# Patient Record
Sex: FEMALE | Race: AMERICAN INDIAN OR ALASKA NATIVE
[De-identification: names, ages, dates, MRNs, and addresses within clinical notes are randomized per-mention and may not be internally consistent; named-entity substitution may affect disease eponyms.]

---

## 2019-05-30 ENCOUNTER — HOSPITAL ENCOUNTER (INPATIENT)
Dept: HOSPITAL 5 - ED | Age: 29
LOS: 3 days | Discharge: HOME | DRG: 871 | End: 2019-06-02
Attending: INTERNAL MEDICINE | Admitting: HOSPITALIST
Payer: SELF-PAY

## 2019-05-30 DIAGNOSIS — A41.9: Primary | ICD-10-CM

## 2019-05-30 DIAGNOSIS — Z79.4: ICD-10-CM

## 2019-05-30 DIAGNOSIS — R65.20: ICD-10-CM

## 2019-05-30 DIAGNOSIS — N30.00: ICD-10-CM

## 2019-05-30 DIAGNOSIS — Z93.1: ICD-10-CM

## 2019-05-30 DIAGNOSIS — Z79.899: ICD-10-CM

## 2019-05-30 DIAGNOSIS — Z91.040: ICD-10-CM

## 2019-05-30 DIAGNOSIS — Z88.5: ICD-10-CM

## 2019-05-30 DIAGNOSIS — G40.909: ICD-10-CM

## 2019-05-30 DIAGNOSIS — R13.10: ICD-10-CM

## 2019-05-30 DIAGNOSIS — E11.65: ICD-10-CM

## 2019-05-30 DIAGNOSIS — J18.9: ICD-10-CM

## 2019-05-30 DIAGNOSIS — G80.9: ICD-10-CM

## 2019-05-30 DIAGNOSIS — Z88.8: ICD-10-CM

## 2019-05-30 LAB
ALBUMIN SERPL-MCNC: 3.8 G/DL (ref 3.9–5)
ALT SERPL-CCNC: 21 UNITS/L (ref 7–56)
BACTERIA #/AREA URNS HPF: (no result) /HPF
BAND NEUTROPHILS # (MANUAL): 0 K/MM3
BILIRUB UR QL STRIP: (no result)
BLOOD UR QL VISUAL: (no result)
BUN SERPL-MCNC: 6 MG/DL (ref 7–17)
BUN/CREAT SERPL: 30 %
CALCIUM SERPL-MCNC: 9.8 MG/DL (ref 8.4–10.2)
HCT VFR BLD CALC: 42.6 % (ref 30.3–42.9)
HEMOLYSIS INDEX: 10
HGB BLD-MCNC: 14.5 GM/DL (ref 10.1–14.3)
INR PPP: 0.95 (ref 0.87–1.13)
MCHC RBC AUTO-ENTMCNC: 34 % (ref 30–34)
MCV RBC AUTO: 89 FL (ref 79–97)
MUCOUS THREADS #/AREA URNS HPF: (no result) /HPF
MYELOCYTES # (MANUAL): 0 K/MM3
PH UR STRIP: 7 [PH] (ref 5–7)
PLATELET # BLD: 220 K/MM3 (ref 140–440)
PROMYELOCYTES # (MANUAL): 0 K/MM3
PROT UR STRIP-MCNC: (no result) MG/DL
RBC # BLD AUTO: 4.81 M/MM3 (ref 3.65–5.03)
RBC #/AREA URNS HPF: 10 /HPF (ref 0–6)
TOTAL CELLS COUNTED BLD: 100
UROBILINOGEN UR-MCNC: 4 MG/DL (ref ?–2)
WBC #/AREA URNS HPF: 17 /HPF (ref 0–6)

## 2019-05-30 PROCEDURE — 84443 ASSAY THYROID STIM HORMONE: CPT

## 2019-05-30 PROCEDURE — 87205 SMEAR GRAM STAIN: CPT

## 2019-05-30 PROCEDURE — 87186 SC STD MICRODIL/AGAR DIL: CPT

## 2019-05-30 PROCEDURE — 81001 URINALYSIS AUTO W/SCOPE: CPT

## 2019-05-30 PROCEDURE — 85610 PROTHROMBIN TIME: CPT

## 2019-05-30 PROCEDURE — 87116 MYCOBACTERIA CULTURE: CPT

## 2019-05-30 PROCEDURE — 85025 COMPLETE CBC W/AUTO DIFF WBC: CPT

## 2019-05-30 PROCEDURE — 87040 BLOOD CULTURE FOR BACTERIA: CPT

## 2019-05-30 PROCEDURE — 99291 CRITICAL CARE FIRST HOUR: CPT

## 2019-05-30 PROCEDURE — 71275 CT ANGIOGRAPHY CHEST: CPT

## 2019-05-30 PROCEDURE — 71045 X-RAY EXAM CHEST 1 VIEW: CPT

## 2019-05-30 PROCEDURE — 85007 BL SMEAR W/DIFF WBC COUNT: CPT

## 2019-05-30 PROCEDURE — 82805 BLOOD GASES W/O2 SATURATION: CPT

## 2019-05-30 PROCEDURE — 96365 THER/PROPH/DIAG IV INF INIT: CPT

## 2019-05-30 PROCEDURE — 82962 GLUCOSE BLOOD TEST: CPT

## 2019-05-30 PROCEDURE — 36415 COLL VENOUS BLD VENIPUNCTURE: CPT

## 2019-05-30 PROCEDURE — 84439 ASSAY OF FREE THYROXINE: CPT

## 2019-05-30 PROCEDURE — 93005 ELECTROCARDIOGRAM TRACING: CPT

## 2019-05-30 PROCEDURE — 80053 COMPREHEN METABOLIC PANEL: CPT

## 2019-05-30 PROCEDURE — 93010 ELECTROCARDIOGRAM REPORT: CPT

## 2019-05-30 PROCEDURE — 87076 CULTURE ANAEROBE IDENT EACH: CPT

## 2019-05-30 PROCEDURE — 87400 INFLUENZA A/B EACH AG IA: CPT

## 2019-05-30 PROCEDURE — 82140 ASSAY OF AMMONIA: CPT

## 2019-05-30 PROCEDURE — 94640 AIRWAY INHALATION TREATMENT: CPT

## 2019-05-30 PROCEDURE — 80048 BASIC METABOLIC PNL TOTAL CA: CPT

## 2019-05-30 PROCEDURE — 87086 URINE CULTURE/COLONY COUNT: CPT

## 2019-05-30 PROCEDURE — 83735 ASSAY OF MAGNESIUM: CPT

## 2019-05-30 RX ADMIN — IPRATROPIUM BROMIDE SCH: 0.5 SOLUTION RESPIRATORY (INHALATION) at 13:14

## 2019-05-30 RX ADMIN — PIPERACILLIN AND TAZOBACTAM SCH MLS/HR: 4; .5 INJECTION, POWDER, FOR SOLUTION INTRAVENOUS at 10:00

## 2019-05-30 RX ADMIN — IPRATROPIUM BROMIDE SCH MG: 0.5 SOLUTION RESPIRATORY (INHALATION) at 19:28

## 2019-05-30 RX ADMIN — LEVALBUTEROL HYDROCHLORIDE SCH: 0.63 SOLUTION RESPIRATORY (INHALATION) at 13:14

## 2019-05-30 RX ADMIN — Medication SCH ML: at 23:03

## 2019-05-30 RX ADMIN — ENOXAPARIN SODIUM SCH MG: 100 INJECTION SUBCUTANEOUS at 09:17

## 2019-05-30 RX ADMIN — INSULIN GLARGINE SCH UNITS: 100 INJECTION, SOLUTION SUBCUTANEOUS at 22:41

## 2019-05-30 RX ADMIN — IPRATROPIUM BROMIDE SCH MG: 0.5 SOLUTION RESPIRATORY (INHALATION) at 08:14

## 2019-05-30 RX ADMIN — LEVALBUTEROL HYDROCHLORIDE SCH MG: 0.63 SOLUTION RESPIRATORY (INHALATION) at 19:28

## 2019-05-30 RX ADMIN — SODIUM CHLORIDE SCH MLS/HR: 0.9 INJECTION, SOLUTION INTRAVENOUS at 09:00

## 2019-05-30 RX ADMIN — ACETAMINOPHEN PRN MG: 325 TABLET ORAL at 08:54

## 2019-05-30 RX ADMIN — LEVALBUTEROL HYDROCHLORIDE SCH MG: 0.63 SOLUTION RESPIRATORY (INHALATION) at 08:13

## 2019-05-30 RX ADMIN — ACETAMINOPHEN PRN MG: 325 TABLET ORAL at 23:03

## 2019-05-30 RX ADMIN — SODIUM CHLORIDE SCH MLS/HR: 0.9 INJECTION, SOLUTION INTRAVENOUS at 22:43

## 2019-05-30 RX ADMIN — Medication SCH: at 15:16

## 2019-05-30 RX ADMIN — PIPERACILLIN AND TAZOBACTAM SCH MLS/HR: 4; .5 INJECTION, POWDER, FOR SOLUTION INTRAVENOUS at 17:42

## 2019-05-30 NOTE — EMERGENCY DEPARTMENT REPORT
ED Fever HPI





- General


Chief Complaint: Fever


Stated Complaint: POSSIBLE SEPSIS, CONGESTION


Time Seen by Provider: 05/30/19 02:38





- History of Present Illness


Initial Comments: 





28-year-old female with history of cerebral palsy, diabetes presents to ED with 

complaint of fever.  Patient is accompanied by her nurse, reports this patient 

had a temperature of 100.2 for which Tylenol was given, with associated cough 

and congestion that began on yesterday.  Other than the cough and fever, nurse 

reports patient is at her baseline.  Patient is tachycardic into the 150s.  

Denies vomiting or diarrhea.


Timing/Duration: yesterday


Fever Severity/Quality: low grade (100.2)


Fever Therapy PTA: Tylenol


Associated Symptoms: cough





ED Review of Systems


ROS: 


Stated complaint: POSSIBLE SEPSIS, CONGESTION


Other details as noted in HPI





Comment: Unobtainable due to pts medical conditions


Constitutional: fever


Respiratory: cough.  denies: shortness of breath





ED Past Medical Hx





- Past Medical History


Previous Medical History?: Yes


Hx Congestive Heart Failure: No


Hx Diabetes: No


Hx Seizures: Yes


Hx Asthma: Yes


Hx COPD: No


Hx HIV: No


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





- Surgical History


Past Surgical History?: Yes


Additional Surgical History: G. tube





- Social History


Smoking Status: Never Smoker


Substance Use Type: None





- Medications


Home Medications: 


                                Home Medications











 Medication  Instructions  Recorded  Confirmed  Last Taken  Type


 


Fluticasone [Flonase] 2 spray NS DAILY 01/26/16 05/30/19 09/15/17 History


 


Osmolite 1.5 Delon 1 can FEEDTUBE TID 01/26/16 05/30/19 09/15/17 10:00 History


 


VALPROIC ACID Liq [DepaKENE Liq] 10 ml PO Q12H 01/26/16 05/30/19 09/15/17 08:00 

History


 


medroxyPROGESTERone ACETATE 150 mg IM [id number] 01/26/16 05/30/19 01/09/16 

History





[Depo-Provera (Contraception)]     


 


AtorvaSTATin [Lipitor] 20 mg PO QHS 05/30/19 05/30/19 Unknown History


 


Insulin Glargine,Hum.rec.anlog 10 unit SQ QAM 05/30/19 05/30/19 Unknown History





[Lantus Solostar]     


 


Insulin Glargine,Hum.rec.anlog 50 unit SQ QPM 05/30/19 05/30/19 Unknown History





[Lantus Solostar]     


 


Insulin Regular, Human [Novolin R] 10 unit SQ TID 05/30/19 05/30/19 Unknown 

History














ED Physical Exam





- General


Limitations: Physical Limitation


General appearance: alert, in no apparent distress





- Head


Head exam: Present: atraumatic, normocephalic





- Eye


Eye exam: Present: EOMI





- ENT


ENT exam: Present: mucous membranes moist, other (sounds congested)





- Neck


Neck exam: Present: normal inspection





- Respiratory


Respiratory exam: Present: rhonchi, other (tachypneic)





- Cardiovascular


Cardiovascular Exam: Present: normal rhythm, tachycardia





- GI/Abdominal


GI/Abdominal exam: Present: soft, other (PEG tube in place).  Absent: distended





- Extremities Exam


Extremities exam: Present: other (contractures present)





- Neurological Exam


Neurological exam: Present: alert, other (at baseline per pt's nurse)





- Psychiatric


Psychiatric exam: Present: normal affect, normal mood





- Skin


Skin exam: Present: warm, dry, intact, normal color





ED Course


                                   Vital Signs











  05/30/19 05/30/19 05/30/19





  02:15 02:30 03:00


 


Temperature 100.0 F H  


 


Pulse Rate 152 H 141 H 143 H


 


Respiratory 28 H 22 25 H





Rate   


 


Blood Pressure 115/73 126/62 118/64


 


Blood Pressure 115/73  





[Right]   


 


O2 Sat by Pulse 97 96 96





Oximetry   














  05/30/19 05/30/19 05/30/19





  04:00 04:30 05:00


 


Temperature   


 


Pulse Rate  137 H 135 H


 


Respiratory 25 H 25 H 26 H





Rate   


 


Blood Pressure 112/69 117/63 122/73


 


Blood Pressure   





[Right]   


 


O2 Sat by Pulse 98 97 92





Oximetry   














  05/30/19





  05:45


 


Temperature 


 


Pulse Rate 


 


Respiratory 





Rate 


 


Blood Pressure 116/75


 


Blood Pressure 





[Right] 


 


O2 Sat by Pulse 100





Oximetry 














ED Medical Decision Making





- Lab Data


Result diagrams: 


                                 05/30/19 02:43





                                 05/30/19 02:43





- EKG Data


-: EKG Interpreted by Me


EKG shows normal: sinus rhythm, axis, intervals, QRS complexes, ST-T waves


Rate: normal





- EKG Data


Interpretation: no acute changes





- Radiology Data


Radiology results: report reviewed, image reviewed





- Medical Decision Making





28-year-old female with diabetes and cerebral palsy presents to ED with fever, 

cough and congestion.  Patient no longer febrile here in ED, nurse reports that 

she had Tylenol prior to arrival.  Patient does have significant rhonchi on 

exam, slightly tachypnea, however chest x-ray does not show any evidence of 

pneumonia or edema.  Some atelectasis is noted in the left lung base.  O2 sats 

have been normal on room air.  WBCs are normal.  However initial lactic acid was

 slightly elevated at 2.4.  Glucose is 206, no evidence of DKA.  UA does show 

evidence of UTI.  Blood cultures and urine cultures were sent.  Initial dose of 

Zosyn given here in the ED.  Tachycardia improved slightly with IV fluid admin

istration.  Patient received 30 mL/kg bolus, for a total of almost 2 L of normal

 saline.  EKG shows sinus tachycardia.  Lactic acid improved to 2.0.  Due to 

persistent tachycardia and respiratory symptoms, CTA Chest was obtained to 

evaluate for PE.  CT negative for PE, but shows pulmonary infiltrates. Will 

admit to hospitalist for further evaluation.





- Differential Diagnosis


pneumonia, URI, influenza, UTI, PE


Critical Care Time: Yes


Critical care time in (mins) excluding proc time.: 35


Critical care attestation.: 


If time is entered above; I have spent that time in minutes in the direct care 

of this critically ill patient, excluding procedure time.





Critical Care Time: 





35 min





ED Disposition


Clinical Impression: 


 Sepsis, UTI (urinary tract infection), Pneumonia





Disposition: DC-[id number] OP ADMIT IP TO THIS HOSP


Is pt being admited?: Yes


Condition: Stable


Instructions:  Bacterial Pneumonia (ED)


Referrals: 


KYLE RAMIREZ MD [Primary Care Provider] - 3-5 Days


Time of Disposition: 04:25

## 2019-05-30 NOTE — EVENT NOTE
Date: 05/30/19


Patient seen and examined, grand mother at bedside, discussed chronic 

tachycardia now exacerbated due to sepsis, family refusing any additional med

ication at this time. Will defer to cardiology consult as requested. Continue 

current therapy, Monitor cultures. started fluids and tolerating.

## 2019-05-30 NOTE — HISTORY AND PHYSICAL REPORT
History of Present Illness


Date of examination: 05/30/19


Chief complaint: 





Fever per report


History of present illness: 





Patient is a 28-year-old female with history of cerebral palsy and diabetes who 

presented to the ED with complaint of fever.  Patient is unable to communicate 

appropriately, so history was obtained from the chart.  It was reported that she

had a temperature of 100.2 with associated cough and congestion that began 

yesterday.  No reported history of vomiting or diarrhea.  In the ED, patient was

found to be tachycardic in the 150s.  





Past History


Past Medical History: seizures, other (scoliosis, mental retardation, cerebral 

palsy, asthma)


Past Surgical History: Other (PEG tube placement)


Social history: no significant social history (no reported history of tobacco, 

alcohol or illicit drug use)


Family history: other (could not be obtained due to baseline altered mental 

status)





Medications and Allergies


                                    Allergies











Allergy/AdvReac Type Severity Reaction Status Date / Time


 


citric acid Allergy  Unknown Verified 01/26/16 14:20


 


codeine Allergy  Unknown Verified 01/26/16 14:20


 


latex Allergy  Hives Verified 04/20/16 16:21


 


phenytoin sodium Allergy  Unknown Verified 01/26/16 14:20





[From Dilantin]     


 


phenytoin sodium extended Allergy  Unknown Verified 01/26/16 14:20





[From Dilantin]     











                                Home Medications











 Medication  Instructions  Recorded  Confirmed  Last Taken  Type


 


Fluticasone [Flonase] 2 spray NS DAILY 01/26/16 05/30/19 09/15/17 History


 


Osmolite 1.5 Delon 1 can FEEDTUBE TID 01/26/16 05/30/19 09/15/17 10:00 History


 


VALPROIC ACID Liq [DepaKENE Liq] 10 ml PO Q12H 01/26/16 05/30/19 09/15/17 08:00 

History


 


medroxyPROGESTERone ACETATE 150 mg IM [id number] 01/26/16 05/30/19 01/09/16 

History





[Depo-Provera (Contraception)]     


 


AtorvaSTATin [Lipitor] 20 mg PO QHS 05/30/19 05/30/19 Unknown History


 


Insulin Glargine,Hum.rec.anlog 10 unit SQ QAM 05/30/19 05/30/19 Unknown History





[Lantus Solostar]     


 


Insulin Glargine,Hum.rec.anlog 50 unit SQ QPM 05/30/19 05/30/19 Unknown History





[Lantus Solostar]     


 


Insulin Regular, Human [Novolin R] 100 unit IJ TID 05/30/19 05/30/19 Unknown 

History











Active Meds: 


Active Medications





Acetaminophen (Tylenol)  650 mg PO Q4H PRN


   PRN Reason: Pain MILD(1-3)/Fever >100.5/HA


Dextrose (D50w (25gm) Syringe)  50 ml IV PRN PRN


   PRN Reason: Hypoglycemia


Enoxaparin Sodium (Lovenox)  40 mg SUB-Q QDAY JILLIAN


Sodium Chloride (Nacl 0.9% 1000 Ml)  1,000 mls @ 100 mls/hr IV AS DIRECT JILLIAN


Insulin Glargine (Lantus)  15 units SUB-Q QHS JILLIAN


Insulin Human Lispro (Humalog)  0 unit SUB-Q ACHS JILLIAN; Protocol


Ondansetron HCl (Zofran)  4 mg IV Q8H PRN


   PRN Reason: Nausea And Vomiting


Sodium Chloride (Sodium Chloride Flush Syringe 10 Ml)  10 ml IV BID JILLIAN


Sodium Chloride (Sodium Chloride Flush Syringe 10 Ml)  10 ml IV PRN PRN


   PRN Reason: LINE FLUSH











Review of Systems


ROS unobtainable: due to mental status (baseline altered mental status)





Exam





- Constitutional


Vitals: 


                                        











Temp Pulse Resp BP Pulse Ox


 


 100.0 F H  152 H  24   115/73   100 


 


 05/30/19 02:15  05/30/19 02:15  05/30/19 03:00  05/30/19 02:15  05/30/19 03:00











General appearance: Present: mild distress





- EENT


Eyes: Present: PERRL, EOM intact


ENT: clear oral mucosa





- Neck


Neck: Present: supple





- Respiratory


Respiratory effort: labored


Respiratory: bilateral: CTA





- Cardiovascular


Rhythm: regular (with tachycardia)


Heart Sounds: Present: S1 & S2





- Extremities


Extremity abnormal: edema (in feet)





- Abdominal


General gastrointestinal: Present: soft, non-tender, normal bowel sounds, other 

(peg tube noted)





- Integumentary


Integumentary: Present: warm, dry





- Musculoskeletal


Musculoskeletal: other (contracted upper and lower extremities)





- Psychiatric


Psychiatric: other (unable to assess due to baseline altered mental status)





- Neurologic


Neurologic: other (patient is awake but unable to follow commands)





Results





- Labs


CBC & Chem 7: 


                                 05/30/19 02:43





                                 05/30/19 02:43


Labs: 


                             Laboratory Last Values











WBC  6.2 K/mm3 (4.5-11.0)   05/30/19  02:43    


 


RBC  4.81 M/mm3 (3.65-5.03)   05/30/19  02:43    


 


Hgb  14.5 gm/dl (10.1-14.3)  H  05/30/19  02:43    


 


Hct  42.6 % (30.3-42.9)   05/30/19  02:43    


 


MCV  89 fl (79-97)   05/30/19  02:43    


 


MCH  30 pg (28-32)   05/30/19  02:43    


 


MCHC  34 % (30-34)   05/30/19  02:43    


 


RDW  15.5 % (13.2-15.2)  H  05/30/19  02:43    


 


Plt Count  220 K/mm3 (140-440)   05/30/19  02:43    


 


Mono % (Auto)  Np   05/30/19  02:43    


 


PT  13.3 Sec. (12.2-14.9)   05/30/19  02:43    


 


INR  0.95  (0.87-1.13)   05/30/19  02:43    


 


VBG pH  7.433  (7.320-7.420)  H  05/30/19  02:43    


 


Sodium  136 mmol/L (137-145)  L  05/30/19  02:43    


 


Potassium  4.0 mmol/L (3.6-5.0)   05/30/19  02:43    


 


Chloride  102.1 mmol/L ()   05/30/19  02:43    


 


Carbon Dioxide  22 mmol/L (22-30)   05/30/19  02:43    


 


  16 mmol/L  05/30/19  02:43    


 


BUN  6 mg/dL (7-17)  L  05/30/19  02:43    


 


  0.2 mg/dL (0.7-1.2)  L  05/30/19  02:43    


 


Estimated GFR  > 60 ml/min  05/30/19  02:43    


 


  30 %  05/30/19  02:43    


 


Glucose  206 mg/dL ()  H  05/30/19  02:43    


 


Lactic Acid  2.00 mmol/L (0.7-2.0)   05/30/19  03:32    


 


Calcium  9.8 mg/dL (8.4-10.2)   05/30/19  02:43    


 


  0.60 mg/dL (0.1-1.2)   05/30/19  02:43    


 


AST  22 units/L (5-40)   05/30/19  02:43    


 


ALT  21 units/L (7-56)   05/30/19  02:43    


 


  109 units/L ()   05/30/19  02:43    


 


  7.6 g/dL (6.3-8.2)   05/30/19  02:43    


 


  3.8 g/dL (3.9-5)  L  05/30/19  02:43    


 


  1.0 %  05/30/19  02:43    


 


  Yellow  (Yellow)   05/30/19  03:28    


 


  Slightly-cloudy  (Clear)   05/30/19  03:28    


 


  7.0  (5.0-7.0)   05/30/19  03:28    


 


Ur Specific Gravity  1.020  (1.003-1.030)   05/30/19  03:28    


 


  <15 mg/dl mg/dL (Negative)   05/30/19  03:28    


 


  50 mg/dL (Negative)   05/30/19  03:28    


 


  Tr mg/dL (Negative)   05/30/19  03:28    


 


  Neg  (Negative)   05/30/19  03:28    


 


  Neg  (Negative)   05/30/19  03:28    


 


  Neg  (Negative)   05/30/19  03:28    


 


  4.0 mg/dL (<2.0)   05/30/19  03:28    


 


Ur Leukocyte Esterase  Tr  (Negative)   05/30/19  03:28    


 


  17.0 /HPF (0.0-6.0)  H  05/30/19  03:28    


 


  10.0 /HPF (0.0-6.0)   05/30/19  03:28    


 


U Epithel Cells (Auto)  1.0 /HPF (0-13.0)   05/30/19  03:28    


 


  4+ /HPF (Negative)   05/30/19  03:28    


 


  2+ /HPF  05/30/19  03:28    














Assessment and Plan


Assessment and plan: 





Severe sepsis


-Probably secondary to UTI versus acute bronchitis/early pneumonia


-Chest x-ray revealed atelectasis in the left lung base, no acute process


-On sepsis protocol 


-On IV antibiotic with Zosyn


-Follow up urine, sputum and blood cultures 





Sinus tachycardia


-HR markedly elevated in the 140s-150s despite IV hydration 


-CTA chest to assess for PE pending





DM with hyperglycemia


-On SSI and Lantus





History of asthma


-No acute exacerbation


-On nebulizer breathing treatments





Cerebral palsy with mental retardation


-At baseline mental status





Seizure disorder


-Stable


-Resume home antiseizure agents





Chronic dysphagia


-Status post PEG tube placement on tube feeding


-Dietitian consulted





DVT prophylaxis with Lovenox








Disposition: For discharge when medically stable





Time spent: 38 minutes

## 2019-05-30 NOTE — XRAY REPORT
PROCEDURE: XR CHEST 1V AP 

 

TECHNIQUE:  Chest radiograph single view.  

 

HISTORY: poss sepsis 

 

COMPARISONS: 9/16/2017 . 

 

FINDINGS: 

 

Heart: Normal. 

Mediastinum/Vessels: Normal. 

Lungs/Pleural space:  There is atelectasis at the left lung base. There are no acute infiltrates. The
re is no pleural effusion or pneumothorax.. 

Bony thorax: No acute osseous abnormality. 

Life support devices: None. 

 

IMPRESSION:   

 

The heart size is normal.. 

 

There is atelectasis at the left lung base. There are no acute infiltrates. There is no pleural effus
ion or pneumothorax.. 

 

This document is electronically signed by Clive De Santiago MD., May 30 2019 03:14:33 AM ET

## 2019-05-31 LAB
BAND NEUTROPHILS # (MANUAL): 0.4 K/MM3
BUN SERPL-MCNC: 4 MG/DL (ref 7–17)
BUN/CREAT SERPL: 20 %
CALCIUM SERPL-MCNC: 8.7 MG/DL (ref 8.4–10.2)
HCT VFR BLD CALC: 35.5 % (ref 30.3–42.9)
HEMOLYSIS INDEX: 5
HGB BLD-MCNC: 12.1 GM/DL (ref 10.1–14.3)
MCHC RBC AUTO-ENTMCNC: 34 % (ref 30–34)
MCV RBC AUTO: 88 FL (ref 79–97)
MYELOCYTES # (MANUAL): 0 K/MM3
PLATELET # BLD: 191 K/MM3 (ref 140–440)
PROMYELOCYTES # (MANUAL): 0 K/MM3
RBC # BLD AUTO: 4.03 M/MM3 (ref 3.65–5.03)
TOTAL CELLS COUNTED BLD: 100

## 2019-05-31 RX ADMIN — PIPERACILLIN AND TAZOBACTAM SCH MLS/HR: 4; .5 INJECTION, POWDER, FOR SOLUTION INTRAVENOUS at 02:18

## 2019-05-31 RX ADMIN — IPRATROPIUM BROMIDE SCH MG: 0.5 SOLUTION RESPIRATORY (INHALATION) at 14:22

## 2019-05-31 RX ADMIN — LEVALBUTEROL HYDROCHLORIDE SCH MG: 0.63 SOLUTION RESPIRATORY (INHALATION) at 19:32

## 2019-05-31 RX ADMIN — CEFTRIAXONE SODIUM SCH MLS/HR: 1 INJECTION, POWDER, FOR SOLUTION INTRAMUSCULAR; INTRAVENOUS at 17:41

## 2019-05-31 RX ADMIN — IPRATROPIUM BROMIDE SCH MG: 0.5 SOLUTION RESPIRATORY (INHALATION) at 02:08

## 2019-05-31 RX ADMIN — LEVALBUTEROL HYDROCHLORIDE SCH MG: 0.63 SOLUTION RESPIRATORY (INHALATION) at 08:24

## 2019-05-31 RX ADMIN — ENOXAPARIN SODIUM SCH MG: 100 INJECTION SUBCUTANEOUS at 09:49

## 2019-05-31 RX ADMIN — Medication SCH: at 09:38

## 2019-05-31 RX ADMIN — IPRATROPIUM BROMIDE SCH MG: 0.5 SOLUTION RESPIRATORY (INHALATION) at 19:32

## 2019-05-31 RX ADMIN — IPRATROPIUM BROMIDE SCH MG: 0.5 SOLUTION RESPIRATORY (INHALATION) at 08:24

## 2019-05-31 RX ADMIN — LEVALBUTEROL HYDROCHLORIDE SCH MG: 0.63 SOLUTION RESPIRATORY (INHALATION) at 02:08

## 2019-05-31 RX ADMIN — PIPERACILLIN AND TAZOBACTAM SCH MLS/HR: 4; .5 INJECTION, POWDER, FOR SOLUTION INTRAVENOUS at 11:05

## 2019-05-31 RX ADMIN — AZITHROMYCIN SCH MLS/HR: 500 INJECTION, POWDER, LYOPHILIZED, FOR SOLUTION INTRAVENOUS at 18:10

## 2019-05-31 RX ADMIN — LEVALBUTEROL HYDROCHLORIDE SCH MG: 0.63 SOLUTION RESPIRATORY (INHALATION) at 14:22

## 2019-05-31 NOTE — PROGRESS NOTE
Assessment and Plan


Assessment and plan: 


Patient is a 28-year-old female with history of cerebral palsy and diabetes who 

presented to the ED with complaint of fever.  Patient is unable to communicate 

appropriately, so history was obtained from the chart.  It was reported that she

had a temperature of 100.2 with associated cough and congestion that began 

yesterday.  No reported history of vomiting or diarrhea.  In the ED, patient was

found to be tachycardic in the 150s.  














Severe sepsis-POA


-Probably secondary to UTI versus acute bronchitis/early pneumonia


   -Left lung infiltrates on CTA 


-Chest x-ray revealed atelectasis in the left lung base, no acute process


-On sepsis protocol 


-On IV antibiotic with Zosyn


-ID consulted


-Follow up urine, sputum and blood cultures 





Acute Cystitis


-Continue abx


-follow urine culture





Sinus tachycardia


-HR markedly elevated in the 140s-150s despite IV hydration 


-CTA chest negative for PE


-Cardiology consulted. 





DM with hyperglycemia


-On SSI and Lantus





History of asthma


-No acute exacerbation


-On nebulizer breathing treatments





Cerebral palsy with mental retardation


-At baseline mental status





Seizure disorder


-Stable


-Resume home antiseizure agents





Chronic dysphagia


-Status post PEG tube placement on tube feeding


-Dietitian consulted





DVT prophylaxis with Lovenox








Time spent: 38 minutes


Plan discussed with family-grandmother and also with cardiology team





History


Interval history: 


Patient seen and examined, more awake, but at baseline, family at bedside. still

with intermittent fever.








Hospitalist Physical





- Physical exam


Narrative exam: 


General appearance: Present: mild distress, not following commands, Cerebral 

deformity with noted proptosis





- EENT


Eyes: Present: PERRL, EOM intact


ENT: clear oral mucosa





- Neck


Neck: Present: supple





- Respiratory


Respiratory effort: labored


Respiratory: bilateral: CTA





- Cardiovascular


Rhythm: regular 


Heart Sounds: Present: S1 & S2





- Extremities


Extremity abnormal: edema (in feet)





- Abdominal


General gastrointestinal: Present: soft, non-tender, normal bowel sounds, other 

(peg tube noted)





- Integumentary


Integumentary: Present: warm, dry





- Musculoskeletal


Musculoskeletal: other (contracted upper and lower extremities)





- Psychiatric


Psychiatric: other (unable to assess due to baseline altered mental status)





- Neurologic


Neurologic: other (patient is awake but unable to follow commands)








- Constitutional


Vitals: 


                                        











Temp Pulse Resp BP Pulse Ox


 


 99 F   122 H  20   98/58   96 


 


 05/31/19 06:02  05/31/19 14:22  05/31/19 14:22  05/31/19 06:02  05/31/19 06:02











General appearance: Present: no acute distress





Results





- Labs


CBC & Chem 7: 


                                 05/31/19 05:53





                                 05/31/19 05:53


Labs: 


                             Laboratory Last Values











WBC  6.2 K/mm3 (4.5-11.0)   05/31/19  05:53    


 


RBC  4.03 M/mm3 (3.65-5.03)   05/31/19  05:53    


 


Hgb  12.1 gm/dl (10.1-14.3)   05/31/19  05:53    


 


Hct  35.5 % (30.3-42.9)  D 05/31/19  05:53    


 


MCV  88 fl (79-97)   05/31/19  05:53    


 


MCH  30 pg (28-32)   05/31/19  05:53    


 


MCHC  34 % (30-34)   05/31/19  05:53    


 


RDW  15.5 % (13.2-15.2)  H  05/31/19  05:53    


 


Plt Count  191 K/mm3 (140-440)   05/31/19  05:53    


 


Mono % (Auto)  Np   05/31/19  05:53    


 


Add Manual Diff  Complete   05/31/19  05:53    


 


Total Counted  100   05/31/19  05:53    


 


Seg Neutrophils %  Np   05/31/19  05:53    


 


Seg Neuts % (Manual)  37.0 % (40.0-70.0)  L  05/31/19  05:53    


 


  7.0 %  05/31/19  05:53    


 


  42.0 % (13.4-35.0)  H  05/31/19  05:53    


 


Reactive Lymphs % (Man)  0 %  05/31/19  05:53    


 


  14.0 % (0.0-7.3)  H  05/31/19  05:53    


 


  0 % (0.0-4.3)   05/31/19  05:53    


 


  0 % (0.0-1.8)   05/31/19  05:53    


 


  0 %  05/31/19  05:53    


 


  0 %  05/31/19  05:53    


 


  0 %  05/31/19  05:53    


 


  0 %  05/31/19  05:53    


 


Nucleated RBC %  Not Reportable   05/31/19  05:53    


 


Seg Neutrophils # Man  2.3 K/mm3 (1.8-7.7)   05/31/19  05:53    


 


Band Neutrophils #  0.4 K/mm3  05/31/19  05:53    


 


  2.6 K/mm3 (1.2-5.4)   05/31/19  05:53    


 


Abs React Lymphs (Man)  0.0 K/mm3  05/31/19  05:53    


 


  0.9 K/mm3 (0.0-0.8)  H  05/31/19  05:53    


 


  0.0 K/mm3 (0.0-0.4)   05/31/19  05:53    


 


  0.0 K/mm3 (0.0-0.1)   05/31/19  05:53    


 


  0.0 K/mm3  05/31/19  05:53    


 


  0.0 K/mm3  05/31/19  05:53    


 


  0.0 K/mm3  05/31/19  05:53    


 


Blast Cells #  0.0 K/mm3  05/31/19  05:53    


 


WBC Morphology  Not Reportable   05/31/19  05:53    


 


Hypersegmented Neuts  Not Reportable   05/31/19  05:53    


 


Hyposegmented Neuts  Not Reportable   05/31/19  05:53    


 


Hypogranular Neuts  Not Reportable   05/31/19  05:53    


 


  Not Reportable   05/31/19  05:53    


 


  Not Reportable   05/31/19  05:53    


 


  Not Reportable   05/31/19  05:53    


 


  Not Reportable   05/31/19  05:53    


 


  Not Reportable   05/31/19  05:53    


 


  Not Reportable   05/31/19  05:53    


 


  Consistent w auto   05/31/19  05:53    


 


  Not Reportable   05/31/19  05:53    


 


Plt Clumps, EDTA  Not Reportable   05/31/19  05:53    


 


  Not Reportable   05/31/19  05:53    


 


  Not Reportable   05/31/19  05:53    


 


  Not Reportable   05/31/19  05:53    


 


Plt Morphology Comment  Not Reportable   05/31/19  05:53    


 


RBC Morphology  Not Reportable   05/31/19  05:53    


 


Dimorphic RBCs  Not Reportable   05/31/19  05:53    


 


  Not Reportable   05/31/19  05:53    


 


  Not Reportable   05/31/19  05:53    


 


  Not Reportable   05/31/19  05:53    


 


  Few   05/31/19  05:53    


 


  Not Reportable   05/31/19  05:53    


 


  Not Reportable   05/31/19  05:53    


 


  Not Reportable   05/31/19  05:53    


 


  Not Reportable   05/31/19  05:53    


 


  Not Reportable   05/31/19  05:53    


 


  Not Reportable   05/31/19  05:53    


 


  Not Reportable   05/31/19  05:53    


 


  Not Reportable   05/31/19  05:53    


 


  Not Reportable   05/31/19  05:53    


 


  Not Reportable   05/31/19  05:53    


 


  Not Reportable   05/31/19  05:53    


 


  Not Reportable   05/31/19  05:53    


 


  Not Reportable   05/31/19  05:53    


 


  Not Reportable   05/31/19  05:53    


 


  Not Reportable   05/31/19  05:53    


 


Acanthocytes (Spur)  Not Reportable   05/31/19  05:53    


 


Rouleaux  Not Reportable   05/31/19  05:53    


 


  Not Reportable   05/31/19  05:53    


 


  Not Reportable   05/31/19  05:53    


 


  Not Reportable   05/31/19  05:53    


 


  Not Reportable   05/31/19  05:53    


 


Hem Pathologist Commnt  No   05/31/19  05:53    


 


PT  13.3 Sec. (12.2-14.9)   05/30/19  02:43    


 


INR  0.95  (0.87-1.13)   05/30/19  02:43    


 


VBG pH  7.433  (7.320-7.420)  H  05/30/19  02:43    


 


Sodium  140 mmol/L (137-145)   05/31/19  05:53    


 


Potassium  3.3 mmol/L (3.6-5.0)  L  05/31/19  05:53    


 


Chloride  106.4 mmol/L ()   05/31/19  05:53    


 


Carbon Dioxide  20 mmol/L (22-30)  L  05/31/19  05:53    


 


  17 mmol/L  05/31/19  05:53    


 


BUN  4 mg/dL (7-17)  L  05/31/19  05:53    


 


  0.2 mg/dL (0.7-1.2)  L  05/31/19  05:53    


 


Estimated GFR  > 60 ml/min  05/31/19  05:53    


 


  20 %  05/31/19  05:53    


 


Glucose  181 mg/dL ()  H  05/31/19  05:53    


 


POC Glucose  176  ()  H  05/31/19  11:50    


 


Lactic Acid  2.00 mmol/L (0.7-2.0)   05/30/19  03:32    


 


Calcium  8.7 mg/dL (8.4-10.2)   05/31/19  05:53    


 


Magnesium  1.80 mg/dL (1.7-2.3)   05/31/19  05:53    


 


  0.60 mg/dL (0.1-1.2)   05/30/19  02:43    


 


AST  22 units/L (5-40)   05/30/19  02:43    


 


ALT  21 units/L (7-56)   05/30/19  02:43    


 


  109 units/L ()   05/30/19  02:43    


 


  7.6 g/dL (6.3-8.2)   05/30/19  02:43    


 


  3.8 g/dL (3.9-5)  L  05/30/19  02:43    


 


  1.0 %  05/30/19  02:43    


 


  Yellow  (Yellow)   05/30/19  03:28    


 


  Slightly-cloudy  (Clear)   05/30/19  03:28    


 


  7.0  (5.0-7.0)   05/30/19  03:28    


 


Ur Specific Gravity  1.020  (1.003-1.030)   05/30/19  03:28    


 


  <15 mg/dl mg/dL (Negative)   05/30/19  03:28    


 


  50 mg/dL (Negative)   05/30/19  03:28    


 


  Tr mg/dL (Negative)   05/30/19  03:28    


 


  Neg  (Negative)   05/30/19  03:28    


 


  Neg  (Negative)   05/30/19  03:28    


 


  Neg  (Negative)   05/30/19  03:28    


 


  4.0 mg/dL (<2.0)   05/30/19  03:28    


 


Ur Leukocyte Esterase  Tr  (Negative)   05/30/19  03:28    


 


  17.0 /HPF (0.0-6.0)  H  05/30/19  03:28    


 


  10.0 /HPF (0.0-6.0)   05/30/19  03:28    


 


U Epithel Cells (Auto)  1.0 /HPF (0-13.0)   05/30/19  03:28    


 


  4+ /HPF (Negative)   05/30/19  03:28    


 


  2+ /HPF  05/30/19  03:28    


 


Influenza A (Rapid)  Negative  (Negative)   05/30/19  Unknown


 


Influenza B (Rapid)  Negative  (Negative)   05/30/19  Unknown














Active Medications





- Current Medications


Current Medications: 














Generic Name Dose Route Start Last Admin





  Trade Name Freq  PRN Reason Stop Dose Admin


 


Acetaminophen  650 mg  05/30/19 05:12  05/30/19 23:03





  Tylenol  PO   650 mg





  Q4H PRN   Administration





  Pain MILD(1-3)/Fever >100.5/HA  


 


Lipase/Protease/Amylase  1 each  05/30/19 12:00 





  Pancreaze Dr 10,500 Unit  FEEDTUBE  





  PRN PRN  





  For Clogged Feeding Tube  


 


Dextrose  50 ml  05/30/19 05:16 





  D50w (25gm) Syringe  IV  





  PRN PRN  





  Hypoglycemia  


 


Enoxaparin Sodium  40 mg  05/30/19 10:00  05/31/19 09:49





  Lovenox  SUB-Q   40 mg





  QDAY JILLIAN   Administration


 


Sodium Chloride  1,000 mls @ 100 mls/hr  05/30/19 06:00  05/30/19 22:43





  Nacl 0.9% 1000 Ml  IV   100 mls/hr





  AS DIRECT JILLIAN   Administration


 


Piperacillin Sod/Tazobactam Sod  4.5 gm in 100 mls @ 200 mls/hr  05/30/19 10:00 

05/31/19 11:05





  Zosyn/Ns 4.5gm/100ml  IV   100 mls/hr





  Q8H JILLIAN   Administration





  Protocol  


 


Insulin Glargine  15 units  05/30/19 22:00  05/30/19 22:41





  Lantus  SUB-Q   15 units





  QHS JILLIAN   Administration


 


Insulin Human Lispro  0 unit  05/30/19 07:30  05/31/19 12:58





  Humalog  SUB-Q   2 unit





  ACHS JILLIAN   Administration





  Protocol  


 


Ipratropium Bromide  0.5 mg  05/30/19 08:00  05/31/19 14:22





  Atrovent  IH   0.5 mg





  Q6HRT JILLIAN   Administration


 


Levalbuterol HCl  0.63 mg  05/30/19 08:00  05/31/19 14:22





  Xopenex  IH   0.63 mg





  Q6HRT JILLIAN   Administration


 


Ondansetron HCl  4 mg  05/30/19 05:12 





  Zofran  IV  





  Q8H PRN  





  Nausea And Vomiting  


 


Simple Syrup  15 ml  05/30/19 12:00 





  Simple Syrup  FEEDTUBE  





  PRN PRN  





  Hypoglycemia  


 


Simple Syrup  30 ml  05/30/19 12:00 





  Simple Syrup  FEEDTUBE  





  PRN PRN  





  Hypoglycemia  


 


Sodium Bicarbonate  325 mg  05/30/19 12:00 





  Sodium Bicarbonate  FEEDTUBE  





  PRN PRN  





  For Clogged Feeding Tube  


 


Sodium Chloride  10 ml  05/30/19 10:00  05/31/19 09:38





  Sodium Chloride Flush Syringe 10 Ml  IV   Not Given





  BID JILLIAN  


 


Sodium Chloride  10 ml  05/30/19 05:12 





  Sodium Chloride Flush Syringe 10 Ml  IV  





  PRN PRN  





  LINE FLUSH  














Nutrition/Malnutrition Assess





- Dietary Evaluation


Nutrition/Malnutrition Findings: 


                                        





Nutrition Notes                                            Start:  05/30/19 

10:41


Freq:                                                      Status: Active       




Protocol:                                                                       




 Document     05/30/19 10:41  RM  (Rec: 05/30/19 10:50  RM  [id number])


 Nutrition Notes


     Need for Assessment generated from:         MD Order


     Initial or Follow up                        Assessment


     Current Diagnosis                           Diabetes,Sepsis


     Other Pertinent Diagnosis                   Cerebral palsy,PEG, Chronic


                                                 dysphagia, Seizure disorder


     Current Diet                                NPO


     Labs/Tests                                  Reviewed


     Pertinent Medications                       Reviewed


     Height                                      4 ft 11 in


     Weight                                      66.2 kg


     Ideal Body Weight (kg)                      43.18


     BMI                                         29.5


     Subjective/Other Information                Consulted for TF


                                                 recommendation.


     Burn                                        Absent


     Trauma                                      Absent


     #1


      Nutrition Diagnosis                        Inadequate oral intake


      Etiology                                   chronic dysphagia


      As Evidenced by Signs and Symptoms         pt NPO status


     Is patient on ventilator?                   No


     Is Patient Ambulatory and/or Out of Bed     No


     REE-([facility]-confined to bed)      1553.136


     Calculation Used for Recommendations        [facility]


     Additional Notes                            Protein Needs: 99-132g (1.5-2g


                                                 /kg)


                                                 Fluid Needs: 1 ml/kcal


 Nutrition Intervention


     Nutrition Support:                          Glucerna 1.2 at 55 ml/hr.


                                                 Water flush of 100 mls q 4 hrs


                                                 .


     Kcal                                        1,584


     Protein (gm)                                79


     Fluid (mL)                                  1,063


     Goal #1                                     TF tolerance


     Goal #2                                     Meet at least 75% of calorie


                                                 and protein needs via TF


     Anticipated Discharge Needs:                TF


     Follow-Up By:                               06/03/19


     Additional Comments                         Follow for new TF

## 2019-05-31 NOTE — CONSULTATION
History of Present Illness





- Reason for Consult


Consult date: 05/31/19


Fever, sepsis, ?UTI


Requesting physician: TEVIN RUBIO





- History of Present Illness


The patient is a 28-year-old female with cerebral palsy and diabetes was brought

into the emergency room yesterday with complaints of fever. The patient is 

nonverbal, does not provide any history. History obtained by chart review and by

speaking to the patient's grandmother at the bedside who is the primary careg

iver. She noted the patient to be a little more lethargic compared to her 

baseline and a low-grade fever and hence brought the patient to the hospital. 

Did not notice any significant cough or shortness of breath. However, the 

patient has been coughing more than usual here. She also did not notice any 

change in urination, foul smell. Patient was admitted to the hospital, found to 

have high fevers, concerns for possible sepsis as well as a UTI versus 

pneumonia. Started empirically on IV Zosyn. Infectious diseases was consulted 

for additional antibiotic recommendations. As per the grandmother, patient's 

last hospitalization was more than a year ago. Patient does have a h/o 

aspiration.





Review of Systems: 


Unable to obtain from patient due to her mental status








Past History


Past Medical History: seizures, other (scoliosis, mental retardation, cerebral 

palsy, asthma)


Past Surgical History: Other (PEG tube placement)


Social history: no significant social history (no reported history of tobacco, 

alcohol or illicit drug use)


Family history: other (could not be obtained due to baseline altered mental 

status)





Medications and Allergies


                                    Allergies











Allergy/AdvReac Type Severity Reaction Status Date / Time


 


citric acid Allergy  Unknown Verified 01/26/16 14:20


 


codeine Allergy  Unknown Verified 01/26/16 14:20


 


latex Allergy  Hives Verified 04/20/16 16:21


 


phenytoin sodium Allergy  Unknown Verified 01/26/16 14:20





[From Dilantin]     


 


phenytoin sodium extended Allergy  Unknown Verified 01/26/16 14:20





[From Dilantin]     











                                Home Medications











 Medication  Instructions  Recorded  Confirmed  Last Taken  Type


 


Fluticasone [Flonase] 2 spray NS DAILY 01/26/16 05/30/19 09/15/17 History


 


Osmolite 1.5 Delon 1 can FEEDTUBE TID 01/26/16 05/30/19 09/15/17 10:00 History


 


VALPROIC ACID Liq [DepaKENE Liq] 10 ml PO Q12H 01/26/16 05/30/19 09/15/17 08:00 

History


 


medroxyPROGESTERone ACETATE 150 mg IM [id number] 01/26/16 05/30/19 01/09/16 

History





[Depo-Provera (Contraception)]     


 


AtorvaSTATin [Lipitor] 20 mg PO QHS 05/30/19 05/30/19 Unknown History


 


Insulin Glargine,Hum.rec.anlog 10 unit SQ QAM 05/30/19 05/30/19 Unknown History





[Lantus Solostar]     


 


Insulin Glargine,Hum.rec.anlog 50 unit SQ QPM 05/30/19 05/30/19 Unknown History





[Lantus Solostar]     


 


Insulin Regular, Human [Novolin R] 10 unit SQ TID 05/30/19 05/30/19 Unknown 

History











Active Meds: 


Active Medications





Acetaminophen (Tylenol)  650 mg PO Q4H PRN


   PRN Reason: Pain MILD(1-3)/Fever >100.5/HA


   Last Admin: 05/30/19 23:03 Dose:  650 mg


   Documented by: 


Lipase/Protease/Amylase (Pancreaze Dr 10,500 Unit)  1 each FEEDTUBE PRN PRN


   PRN Reason: For Clogged Feeding Tube


Dextrose (D50w (25gm) Syringe)  50 ml IV PRN PRN


   PRN Reason: Hypoglycemia


Enoxaparin Sodium (Lovenox)  40 mg SUB-Q QDAY [facility]


   Last Admin: 05/31/19 09:49 Dose:  40 mg


   Documented by: 


Sodium Chloride (Nacl 0.9% 1000 Ml)  1,000 mls @ 100 mls/hr IV AS DIRECT JILLIAN


   Last Admin: 05/30/19 22:43 Dose:  100 mls/hr


   Documented by: 


Piperacillin Sod/Tazobactam Sod (Zosyn/Ns 4.5gm/100ml)  4.5 gm in 100 mls @ 200 

mls/hr IV Q8H [facility]; Protocol


   Last Admin: 05/31/19 11:05 Dose:  100 mls/hr


   Documented by: 


Insulin Glargine (Lantus)  15 units SUB-Q QHS [facility]


   Last Admin: 05/30/19 22:41 Dose:  15 units


   Documented by: 


Insulin Human Lispro (Humalog)  0 unit SUB-Q ACHS [facility]; Protocol


   Last Admin: 05/31/19 12:58 Dose:  2 unit


   Documented by: 


Ipratropium Bromide (Atrovent)  0.5 mg IH Q6HRT [facility]


   Last Admin: 05/31/19 14:22 Dose:  0.5 mg


   Documented by: 


Levalbuterol HCl (Xopenex)  0.63 mg IH Q6HRT [facility]


   Last Admin: 05/31/19 14:22 Dose:  0.63 mg


   Documented by: 


Ondansetron HCl (Zofran)  4 mg IV Q8H PRN


   PRN Reason: Nausea And Vomiting


Simple Syrup (Simple Syrup)  15 ml FEEDTUBE PRN PRN


   PRN Reason: Hypoglycemia


Simple Syrup (Simple Syrup)  30 ml FEEDTUBE PRN PRN


   PRN Reason: Hypoglycemia


Sodium Bicarbonate (Sodium Bicarbonate)  325 mg FEEDTUBE PRN PRN


   PRN Reason: For Clogged Feeding Tube


Sodium Chloride (Sodium Chloride Flush Syringe 10 Ml)  10 ml IV BID [facility]


   Last Admin: 05/31/19 09:38 Dose:  Not Given


   Documented by: 


Sodium Chloride (Sodium Chloride Flush Syringe 10 Ml)  10 ml IV PRN PRN


   PRN Reason: LINE FLUSH











Physical Examination





- Physical Exam


Narrative exam: 








Physical Exam: 


Constitutional: awake, alert, non verbal


Head, Ears, Nose: Normocephalic, atraumatic. External ears, nose normal


Eyes: Conjunctivae/corneas clear. No icterus. No ptosis.


Neck: Supple, no meningeal signs


Oral: unable to do complete exam


Cardiovascular: S1, S2 normal. 


Respiratory: Good air entry, clear to auscultation bilaterally


GI: Soft, non-tender; bowel sounds normal. No peritoneal signs. G tube +


Musculoskeletal: contractures, no pedal edema


Skin: No rash or abscess


Hem/Lymphatic: No palpable cervical or supraclavicular nodes. No lymphangitis


Psych: no agitation


Neurological: Awake, non verbal





- Constitutional


Vitals: 


                                   Vital Signs











Temp Pulse Resp BP Pulse Ox


 


 99 F   122 H  20   98/58   96 


 


 05/31/19 06:02  05/31/19 14:22  05/31/19 14:22  05/31/19 06:02  05/31/19 06:02








                           Temperature -Last 24 Hours











Temperature                    99 F


 


Temperature                    102.3 F


 


Temperature                    101.1 F

















Results





- Labs


CBC & Chem 7: 


                                 05/31/19 05:53





                                 05/31/19 05:53


Labs: 


                              Abnormal lab results











  05/30/19 05/30/19 05/31/19 Range/Units





  07:08 16:35 05:53 


 


RDW    15.5 H  (13.2-15.2)  %


 


Seg Neuts % (Manual)    37.0 L  (40.0-70.0)  %


 


Lymphocytes % (Manual)    42.0 H  (13.4-35.0)  %


 


Monocytes % (Manual)    14.0 H  (0.0-7.3)  %


 


Monocytes # (Manual)    0.9 H  (0.0-0.8)  K/mm3


 


Potassium     (3.6-5.0)  mmol/L


 


Carbon Dioxide     (22-30)  mmol/L


 


BUN     (7-17)  mg/dL


 


Creatinine     (0.7-1.2)  mg/dL


 


Glucose     ()  mg/dL


 


POC Glucose  178 H  149 H   ()  














  05/31/19 05/31/19 05/31/19 Range/Units





  05:53 07:43 11:50 


 


RDW     (13.2-15.2)  %


 


Seg Neuts % (Manual)     (40.0-70.0)  %


 


Lymphocytes % (Manual)     (13.4-35.0)  %


 


Monocytes % (Manual)     (0.0-7.3)  %


 


Monocytes # (Manual)     (0.0-0.8)  K/mm3


 


Potassium  3.3 L    (3.6-5.0)  mmol/L


 


Carbon Dioxide  20 L    (22-30)  mmol/L


 


BUN  4 L    (7-17)  mg/dL


 


Creatinine  0.2 L    (0.7-1.2)  mg/dL


 


Glucose  181 H    ()  mg/dL


 


POC Glucose   184 H  176 H  ()  














- Imaging and Cardiology


Chest x-ray: report reviewed, image reviewed (elevated left hemidiaphragm)


CT scan - chest: report reviewed, image reviewed (small left lower lobe inf

iltrate)





Assessment and Plan





Cultures:


05/30/2019 blood culture: No growth in 24 hours


05/30/2019 urine culture: Gram-negative yony





A/P:


28-year-old female with cerebral palsy and diabetes, admitted with:





1) Fever, possibly secondary to an atypical pneumonia (possibly even viral), 

UTI/cystitis possible but seems less likely. UA only had minimal pyuria patient 

is nonverbal, unable to provide history. She has been having an increased cough 

while here in the hospital with CT evidence of a small infiltrate in the left l

ower lobe. No leukocytosis on admission. As per the grandmother, patient's last 

hospitalization was more than a year ago. Low risk for MDR organisms.





Recs:


Discontinued Zosyn


Started IV ceftriaxone and azithromycin


If fever resolves, possibly d/c on PO abx (Ceftin & Azithromycin)





D/W Dr. Hannah Rosario MD


[facility] 


C: [phone]


O: [phone]


F: [phone]

## 2019-06-01 LAB
BUN SERPL-MCNC: 6 MG/DL (ref 7–17)
BUN/CREAT SERPL: 30 %
CALCIUM SERPL-MCNC: 9.4 MG/DL (ref 8.4–10.2)
HEMOLYSIS INDEX: 3

## 2019-06-01 RX ADMIN — LEVALBUTEROL HYDROCHLORIDE SCH MG: 0.63 SOLUTION RESPIRATORY (INHALATION) at 09:56

## 2019-06-01 RX ADMIN — IPRATROPIUM BROMIDE SCH MG: 0.5 SOLUTION RESPIRATORY (INHALATION) at 13:56

## 2019-06-01 RX ADMIN — AZITHROMYCIN SCH MLS/HR: 500 INJECTION, POWDER, LYOPHILIZED, FOR SOLUTION INTRAVENOUS at 11:56

## 2019-06-01 RX ADMIN — LEVALBUTEROL HYDROCHLORIDE SCH MG: 0.63 SOLUTION RESPIRATORY (INHALATION) at 20:11

## 2019-06-01 RX ADMIN — INSULIN GLARGINE SCH UNITS: 100 INJECTION, SOLUTION SUBCUTANEOUS at 00:13

## 2019-06-01 RX ADMIN — INSULIN GLARGINE SCH UNITS: 100 INJECTION, SOLUTION SUBCUTANEOUS at 22:15

## 2019-06-01 RX ADMIN — SODIUM CHLORIDE SCH MLS/HR: 0.9 INJECTION, SOLUTION INTRAVENOUS at 18:13

## 2019-06-01 RX ADMIN — CEFTRIAXONE SODIUM SCH MLS/HR: 1 INJECTION, POWDER, FOR SOLUTION INTRAMUSCULAR; INTRAVENOUS at 10:01

## 2019-06-01 RX ADMIN — Medication SCH ML: at 22:16

## 2019-06-01 RX ADMIN — LEVALBUTEROL HYDROCHLORIDE SCH MG: 0.63 SOLUTION RESPIRATORY (INHALATION) at 13:56

## 2019-06-01 RX ADMIN — IPRATROPIUM BROMIDE SCH MG: 0.5 SOLUTION RESPIRATORY (INHALATION) at 20:11

## 2019-06-01 RX ADMIN — Medication SCH ML: at 09:54

## 2019-06-01 RX ADMIN — LEVALBUTEROL HYDROCHLORIDE SCH MG: 0.63 SOLUTION RESPIRATORY (INHALATION) at 01:41

## 2019-06-01 RX ADMIN — IPRATROPIUM BROMIDE SCH MG: 0.5 SOLUTION RESPIRATORY (INHALATION) at 01:41

## 2019-06-01 RX ADMIN — IPRATROPIUM BROMIDE SCH MG: 0.5 SOLUTION RESPIRATORY (INHALATION) at 09:56

## 2019-06-01 RX ADMIN — Medication SCH ML: at 00:14

## 2019-06-01 RX ADMIN — ENOXAPARIN SODIUM SCH MG: 100 INJECTION SUBCUTANEOUS at 09:54

## 2019-06-01 NOTE — PROGRESS NOTE
Assessment and Plan


Assessment and plan: 


Patient is a 28-year-old female with history of cerebral palsy and diabetes who 

presented to the ED with complaint of fever.  Patient is unable to communicate 

appropriately, so history was obtained from the chart.  It was reported that she

had a temperature of 100.2 with associated cough and congestion that began 

yesterday.  No reported history of vomiting or diarrhea.  In the ED, patient was

found to be tachycardic in the 150s.  








Severe sepsis-POA


-Probably secondary pneumonia


   -Left lung infiltrates on CTA 


-Chest x-ray revealed atelectasis in the left lung base, no acute process


-On sepsis protocol 


-On IV antibiotic CHANGED FROM ZOSYN-CEFTRIAXONE AND AZITHROMYCIN


-ID consulted


-Follow up urine, sputum and blood cultures 


-on discharge PO abx (Ceftin & Azithromycin) per ID





Acute Cystitis


-Continue abx


-follow urine culture





Sinus tachycardia


-HR markedly elevated in the 140s-150s despite IV hydration 


-CTA chest negative for PE


-Cardiology consulted. 





DM with hyperglycemia


-On SSI and Lantus





History of asthma


-No acute exacerbation


-On nebulizer breathing treatments





Cerebral palsy with mental retardation


-At baseline mental status





Seizure disorder


-Stable


-Resume home antiseizure agents





Chronic dysphagia


-Status post PEG tube placement on tube feeding


-Dietitian consulted





DVT prophylaxis with Lovenox





Plan discussed with family-grandmother and also with cardiology team





History


Interval history: 


Patient seen and examined, more awake, but at baseline, family at bedside. Still

with intermittent fever.








Hospitalist Physical





- Physical exam


Narrative exam: 


General appearance: Present: mild distress, not following commands, Cerebral 

deformity with noted proptosis





- EENT


Eyes: Present: PERRL, EOM intact


ENT: clear oral mucosa





- Neck


Neck: Present: supple





- Respiratory


Respiratory effort: labored


Respiratory: bilateral: CTA





- Cardiovascular


Rhythm: regular 


Heart Sounds: Present: S1 & S2





- Extremities


Extremity abnormal: edema (in feet)





- Abdominal


General gastrointestinal: Present: soft, non-tender, normal bowel sounds, other 

(peg tube noted)





- Integumentary


Integumentary: Present: warm, dry





- Musculoskeletal


Musculoskeletal: other (contracted upper and lower extremities)





- Psychiatric


Psychiatric: other (unable to assess due to baseline altered mental status)





- Neurologic


Neurologic: other (patient is awake but unable to follow commands)








- Constitutional


Vitals: 


                                        











Temp Pulse Resp BP Pulse Ox


 


 97.8 F   111 H  18   138/89   100 


 


 06/01/19 05:41  06/01/19 10:02  06/01/19 10:02  06/01/19 05:41  06/01/19 05:41











General appearance: Present: no acute distress





Results





- Labs


CBC & Chem 7: 


                                 05/31/19 05:53





                                 06/01/19 10:23


Labs: 


                             Laboratory Last Values











WBC  6.2 K/mm3 (4.5-11.0)   05/31/19  05:53    


 


RBC  4.03 M/mm3 (3.65-5.03)   05/31/19  05:53    


 


Hgb  12.1 gm/dl (10.1-14.3)   05/31/19  05:53    


 


Hct  35.5 % (30.3-42.9)  D 05/31/19  05:53    


 


MCV  88 fl (79-97)   05/31/19  05:53    


 


MCH  30 pg (28-32)   05/31/19  05:53    


 


MCHC  34 % (30-34)   05/31/19  05:53    


 


RDW  15.5 % (13.2-15.2)  H  05/31/19  05:53    


 


Plt Count  191 K/mm3 (140-440)   05/31/19  05:53    


 


Mono % (Auto)  Np   05/31/19  05:53    


 


Add Manual Diff  Complete   05/31/19  05:53    


 


Total Counted  100   05/31/19  05:53    


 


Seg Neutrophils %  Np   05/31/19  05:53    


 


Seg Neuts % (Manual)  37.0 % (40.0-70.0)  L  05/31/19  05:53    


 


  7.0 %  05/31/19  05:53    


 


  42.0 % (13.4-35.0)  H  05/31/19  05:53    


 


Reactive Lymphs % (Man)  0 %  05/31/19  05:53    


 


  14.0 % (0.0-7.3)  H  05/31/19  05:53    


 


  0 % (0.0-4.3)   05/31/19  05:53    


 


  0 % (0.0-1.8)   05/31/19  05:53    


 


  0 %  05/31/19  05:53    


 


  0 %  05/31/19  05:53    


 


  0 %  05/31/19  05:53    


 


  0 %  05/31/19  05:53    


 


Nucleated RBC %  Not Reportable   05/31/19  05:53    


 


Seg Neutrophils # Man  2.3 K/mm3 (1.8-7.7)   05/31/19  05:53    


 


Band Neutrophils #  0.4 K/mm3  05/31/19  05:53    


 


  2.6 K/mm3 (1.2-5.4)   05/31/19  05:53    


 


Abs React Lymphs (Man)  0.0 K/mm3  05/31/19  05:53    


 


  0.9 K/mm3 (0.0-0.8)  H  05/31/19  05:53    


 


  0.0 K/mm3 (0.0-0.4)   05/31/19  05:53    


 


  0.0 K/mm3 (0.0-0.1)   05/31/19  05:53    


 


  0.0 K/mm3  05/31/19  05:53    


 


  0.0 K/mm3  05/31/19  05:53    


 


  0.0 K/mm3  05/31/19  05:53    


 


Blast Cells #  0.0 K/mm3  05/31/19  05:53    


 


WBC Morphology  Not Reportable   05/31/19  05:53    


 


Hypersegmented Neuts  Not Reportable   05/31/19  05:53    


 


Hyposegmented Neuts  Not Reportable   05/31/19  05:53    


 


Hypogranular Neuts  Not Reportable   05/31/19  05:53    


 


  Not Reportable   05/31/19  05:53    


 


  Not Reportable   05/31/19  05:53    


 


  Not Reportable   05/31/19  05:53    


 


  Not Reportable   05/31/19  05:53    


 


  Not Reportable   05/31/19  05:53    


 


  Not Reportable   05/31/19  05:53    


 


  Consistent w auto   05/31/19  05:53    


 


  Not Reportable   05/31/19  05:53    


 


Plt Clumps, EDTA  Not Reportable   05/31/19  05:53    


 


  Not Reportable   05/31/19  05:53    


 


  Not Reportable   05/31/19  05:53    


 


  Not Reportable   05/31/19  05:53    


 


Plt Morphology Comment  Not Reportable   05/31/19  05:53    


 


RBC Morphology  Not Reportable   05/31/19  05:53    


 


Dimorphic RBCs  Not Reportable   05/31/19  05:53    


 


  Not Reportable   05/31/19  05:53    


 


  Not Reportable   05/31/19  05:53    


 


  Not Reportable   05/31/19  05:53    


 


  Few   05/31/19  05:53    


 


  Not Reportable   05/31/19  05:53    


 


  Not Reportable   05/31/19  05:53    


 


  Not Reportable   05/31/19  05:53    


 


  Not Reportable   05/31/19  05:53    


 


  Not Reportable   05/31/19  05:53    


 


  Not Reportable   05/31/19  05:53    


 


  Not Reportable   05/31/19  05:53    


 


  Not Reportable   05/31/19  05:53    


 


  Not Reportable   05/31/19  05:53    


 


  Not Reportable   05/31/19  05:53    


 


  Not Reportable   05/31/19  05:53    


 


  Not Reportable   05/31/19  05:53    


 


  Not Reportable   05/31/19  05:53    


 


  Not Reportable   05/31/19  05:53    


 


  Not Reportable   05/31/19  05:53    


 


Acanthocytes (Spur)  Not Reportable   05/31/19  05:53    


 


Rouleaux  Not Reportable   05/31/19  05:53    


 


  Not Reportable   05/31/19  05:53    


 


  Not Reportable   05/31/19  05:53    


 


  Not Reportable   05/31/19  05:53    


 


  Not Reportable   05/31/19  05:53    


 


Hem Pathologist Commnt  No   05/31/19  05:53    


 


PT  13.3 Sec. (12.2-14.9)   05/30/19  02:43    


 


INR  0.95  (0.87-1.13)   05/30/19  02:43    


 


VBG pH  7.433  (7.320-7.420)  H  05/30/19  02:43    


 


Sodium  140 mmol/L (137-145)   06/01/19  10:23    


 


Potassium  4.0 mmol/L (3.6-5.0)  D 06/01/19  10:23    


 


Chloride  102.2 mmol/L ()   06/01/19  10:23    


 


Carbon Dioxide  22 mmol/L (22-30)   06/01/19  10:23    


 


  20 mmol/L  06/01/19  10:23    


 


BUN  6 mg/dL (7-17)  L  06/01/19  10:23    


 


  0.2 mg/dL (0.7-1.2)  L  06/01/19  10:23    


 


Estimated GFR  > 60 ml/min  06/01/19  10:23    


 


  30 %  06/01/19  10:23    


 


Glucose  198 mg/dL ()  H  06/01/19  10:23    


 


POC Glucose  193  ()  H  06/01/19  08:28    


 


Lactic Acid  2.00 mmol/L (0.7-2.0)   05/30/19  03:32    


 


Calcium  9.4 mg/dL (8.4-10.2)   06/01/19  10:23    


 


Magnesium  1.80 mg/dL (1.7-2.3)   05/31/19  05:53    


 


  0.60 mg/dL (0.1-1.2)   05/30/19  02:43    


 


AST  22 units/L (5-40)   05/30/19  02:43    


 


ALT  21 units/L (7-56)   05/30/19  02:43    


 


  109 units/L ()   05/30/19  02:43    


 


  7.6 g/dL (6.3-8.2)   05/30/19  02:43    


 


  3.8 g/dL (3.9-5)  L  05/30/19  02:43    


 


  1.0 %  05/30/19  02:43    


 


TSH  4.720 mlU/mL (0.270-4.200)  H  05/31/19  15:57    


 


Free T4  1.17 ng/dL (0.76-1.46)   05/31/19  15:57    


 


  Yellow  (Yellow)   05/30/19  03:28    


 


  Slightly-cloudy  (Clear)   05/30/19  03:28    


 


  7.0  (5.0-7.0)   05/30/19  03:28    


 


Ur Specific Gravity  1.020  (1.003-1.030)   05/30/19  03:28    


 


  <15 mg/dl mg/dL (Negative)   05/30/19  03:28    


 


  50 mg/dL (Negative)   05/30/19  03:28    


 


  Tr mg/dL (Negative)   05/30/19  03:28    


 


  Neg  (Negative)   05/30/19  03:28    


 


  Neg  (Negative)   05/30/19  03:28    


 


  Neg  (Negative)   05/30/19  03:28    


 


  4.0 mg/dL (<2.0)   05/30/19  03:28    


 


Ur Leukocyte Esterase  Tr  (Negative)   05/30/19  03:28    


 


  17.0 /HPF (0.0-6.0)  H  05/30/19  03:28    


 


  10.0 /HPF (0.0-6.0)   05/30/19  03:28    


 


U Epithel Cells (Auto)  1.0 /HPF (0-13.0)   05/30/19  03:28    


 


  4+ /HPF (Negative)   05/30/19  03:28    


 


  2+ /HPF  05/30/19  03:28    


 


Influenza A (Rapid)  Negative  (Negative)   05/30/19  Unknown


 


Influenza B (Rapid)  Negative  (Negative)   05/30/19  Unknown














Active Medications





- Current Medications


Current Medications: 














Generic Name Dose Route Start Last Admin





  Trade Name Freq  PRN Reason Stop Dose Admin


 


Acetaminophen  650 mg  05/30/19 05:12  05/30/19 23:03





  Tylenol  PO   650 mg





  Q4H PRN   Administration





  Pain MILD(1-3)/Fever >100.5/HA  


 


Lipase/Protease/Amylase  1 each  05/30/19 12:00 





  Pancreaze  10,500 Unit  FEEDTUBE  





  PRN PRN  





  For Clogged Feeding Tube  


 


Dextrose  50 ml  05/30/19 05:16 





  D50w (25gm) Syringe  IV  





  PRN PRN  





  Hypoglycemia  


 


Enoxaparin Sodium  40 mg  05/30/19 10:00  06/01/19 09:54





  Lovenox  SUB-Q   40 mg





  QDAY JILLIAN   Administration


 


Sodium Chloride  1,000 mls @ 100 mls/hr  05/30/19 06:00  05/30/19 22:43





  Nacl 0.9% 1000 Ml  IV   100 mls/hr





  AS DIRECT JILLIAN   Administration


 


Azithromycin 500 mg/ Sodium  250 mls @ 250 mls/hr  05/31/19 16:00  06/01/19 

11:56





  Chloride  IV   250 mls/hr





  Q24HR JILLIAN   Administration


 


Ceftriaxone Sodium  1 gm in 50 mls @ 100 mls/hr  05/31/19 17:00  06/01/19 10:01





  Rocephin/Ns 1 Gm/50 Ml  IV   100 mls/hr





  Q24HR JILLIAN   Administration





  Protocol  


 


Insulin Glargine  15 units  05/30/19 22:00  06/01/19 00:13





  Lantus  SUB-Q   15 units





  QHS JILLIAN   Administration


 


Insulin Human Lispro  0 unit  05/30/19 07:30  06/01/19 08:45





  Humalog  SUB-Q   2 unit





  ACHS JILLIAN   Administration





  Protocol  


 


Ipratropium Bromide  0.5 mg  05/30/19 08:00  06/01/19 09:56





  Atrovent  IH   0.5 mg





  Q6HRT JILLIAN   Administration


 


Levalbuterol HCl  0.63 mg  05/30/19 08:00  06/01/19 09:56





  Xopenex  IH   0.63 mg





  Q6HRT JILLIAN   Administration


 


Ondansetron HCl  4 mg  05/30/19 05:12 





  Zofran  IV  





  Q8H PRN  





  Nausea And Vomiting  


 


Simple Syrup  15 ml  05/30/19 12:00 





  Simple Syrup  FEEDTUBE  





  PRN PRN  





  Hypoglycemia  


 


Simple Syrup  30 ml  05/30/19 12:00 





  Simple Syrup  FEEDTUBE  





  PRN PRN  





  Hypoglycemia  


 


Sodium Bicarbonate  325 mg  05/30/19 12:00 





  Sodium Bicarbonate  FEEDTUBE  





  PRN PRN  





  For Clogged Feeding Tube  


 


Sodium Chloride  10 ml  05/30/19 10:00  06/01/19 09:54





  Sodium Chloride Flush Syringe 10 Ml  IV   10 ml





  BID JILLIAN   Administration


 


Sodium Chloride  10 ml  05/30/19 05:12 





  Sodium Chloride Flush Syringe 10 Ml  IV  





  PRN PRN  





  LINE FLUSH  














Nutrition/Malnutrition Assess





- Dietary Evaluation


Nutrition/Malnutrition Findings: 


                                        





Nutrition Notes                                            Start:  05/30/19 

10:41


Freq:                                                      Status: Active       




Protocol:                                                                       




 Document     05/30/19 10:41  RM  (Rec: 05/30/19 10:50  RM  [id number])


 Nutrition Notes


     Need for Assessment generated from:         MD Order


     Initial or Follow up                        Assessment


     Current Diagnosis                           Diabetes,Sepsis


     Other Pertinent Diagnosis                   Cerebral palsy,PEG, Chronic


                                                 dysphagia, Seizure disorder


     Current Diet                                NPO


     Labs/Tests                                  Reviewed


     Pertinent Medications                       Reviewed


     Height                                      4 ft 11 in


     Weight                                      66.2 kg


     Ideal Body Weight (kg)                      43.18


     BMI                                         29.5


     Subjective/Other Information                Consulted for TF


                                                 recommendation.


     Burn                                        Absent


     Trauma                                      Absent


     #1


      Nutrition Diagnosis                        Inadequate oral intake


      Etiology                                   chronic dysphagia


      As Evidenced by Signs and Symptoms         pt NPO status


     Is patient on ventilator?                   No


     Is Patient Ambulatory and/or Out of Bed     No


     REE-([facility]-confined to bed)      [phone]


     Calculation Used for Recommendations        [facility]


     Additional Notes                            Protein Needs: 99-132g (1.5-2g


                                                 /kg)


                                                 Fluid Needs: 1 ml/kcal


 Nutrition Intervention


     Nutrition Support:                          Glucerna 1.2 at 55 ml/hr.


                                                 Water flush of 100 mls q 4 hrs


                                                 .


     Kcal                                        1,584


     Protein (gm)                                79


     Fluid (mL)                                  1,063


     Goal #1                                     TF tolerance


     Goal #2                                     Meet at least 75% of calorie


                                                 and protein needs via TF


     Anticipated Discharge Needs:                TF


     Follow-Up By:                               06/03/19


     Additional Comments                         Follow for new TF











- Attestation Statement


I have reviewed and agreed w/ Malnutrition eval & tx plan: Yes

## 2019-06-02 VITALS — DIASTOLIC BLOOD PRESSURE: 57 MMHG | SYSTOLIC BLOOD PRESSURE: 111 MMHG

## 2019-06-02 RX ADMIN — SODIUM CHLORIDE SCH MLS/HR: 0.9 INJECTION, SOLUTION INTRAVENOUS at 02:48

## 2019-06-02 RX ADMIN — IPRATROPIUM BROMIDE SCH MG: 0.5 SOLUTION RESPIRATORY (INHALATION) at 07:54

## 2019-06-02 RX ADMIN — Medication SCH ML: at 10:34

## 2019-06-02 RX ADMIN — LEVALBUTEROL HYDROCHLORIDE SCH MG: 0.63 SOLUTION RESPIRATORY (INHALATION) at 07:54

## 2019-06-02 RX ADMIN — ENOXAPARIN SODIUM SCH MG: 100 INJECTION SUBCUTANEOUS at 10:34

## 2019-06-02 RX ADMIN — LEVALBUTEROL HYDROCHLORIDE SCH MG: 0.63 SOLUTION RESPIRATORY (INHALATION) at 01:54

## 2019-06-02 RX ADMIN — IPRATROPIUM BROMIDE SCH MG: 0.5 SOLUTION RESPIRATORY (INHALATION) at 01:54

## 2019-06-02 RX ADMIN — CEFTRIAXONE SODIUM SCH MLS/HR: 1 INJECTION, POWDER, FOR SOLUTION INTRAMUSCULAR; INTRAVENOUS at 10:33

## 2019-06-02 NOTE — PROGRESS NOTE
Assessment and Plan





Cultures:


05/30/2019 blood culture: No growth in 24 hours


05/30/2019 urine culture: Klebsiella





A/P:


28-year-old female with cerebral palsy and diabetes, admitted with:





1) Fever, possibly secondary to an atypical pneumonia (possibly even viral), 

UTI/cystitis possible but seems less likely. UA only had minimal pyuria patient 

is nonverbal, unable to provide history. She has been having an increased cough 

while here in the hospital with CT evidence of a small infiltrate in the left 

lower lobe. No leukocytosis on admission. As per the grandmother, patient's last

hospitalization was more than a year ago. Low risk for MDR organisms.





Recs:


OK to d/c on PO Ceftin 500 mg BID x 4 days and PO Azithromycin 500 mg daily x 2 

more days








Jaye Rosario MD


[facility] 


C: [phone]


O: [phone]


F: [phone]





Subjective


Date of service: 06/02/19


Interval history: 





afebrile. non verbal. no respiratory distress. Does have gurgling of oral secret

ions.





Objective





- Exam


Narrative Exam: 








Physical Exam: 


Constitutional: awake, alert, non verbal


Head, Ears, Nose: Normocephalic, atraumatic. External ears, nose normal


Eyes: Conjunctivae/corneas clear. No icterus. No ptosis.


Neck: Supple, no meningeal signs


Oral: unable to do complete exam but gurgling +_


Cardiovascular: S1, S2 normal. 


Respiratory: Good air entry, clear to auscultation bilaterally


GI: Soft, non-tender; bowel sounds normal. No peritoneal signs. G tube +


Musculoskeletal: contractures, no pedal edema


Skin: No rash or abscess


Hem/Lymphatic: No palpable cervical or supraclavicular nodes. No lymphangitis


Psych: no agitation


Neurological: Awake, non verbal





- Constitutional


Vitals: 


                                   Vital Signs











Temp Pulse Resp BP Pulse Ox


 


 97.9 F   105 H  17   111/57   95 


 


 06/02/19 05:44  06/02/19 07:54  06/02/19 07:54  06/02/19 05:44  06/02/19 05:44








                           Temperature -Last 24 Hours











Temperature                    97.9 F


 


Temperature                    98.7 F


 


Temperature                    99.3 F


 


Temperature                    99.0 F

















- Labs


CBC & Chem 7: 


                                 05/31/19 05:53





                                 06/01/19 10:23


Labs: 


                              Abnormal lab results











  06/01/19 06/01/19 06/01/19 Range/Units





  10:23 12:54 16:10 


 


BUN  6 L    (7-17)  mg/dL


 


Creatinine  0.2 L    (0.7-1.2)  mg/dL


 


Glucose  198 H    ()  mg/dL


 


POC Glucose   201 H  182 H  ()  














  06/01/19 06/02/19 Range/Units





  21:58 08:03 


 


BUN    (7-17)  mg/dL


 


Creatinine    (0.7-1.2)  mg/dL


 


Glucose    ()  mg/dL


 


POC Glucose  172 H  194 H  ()

## 2019-06-02 NOTE — DISCHARGE SUMMARY
Providers





- Providers


Date of Admission: 


05/30/19 06:15





Attending physician: 


TEVIN RUBIO MD





                                        





05/30/19 05:30


Consult to Dietitian/Nutrition [CONS] Routine 


   Physician Instructions: 


   Reason For Exam: 


   Reason for Consult: Write/Manage Tube Feeding





05/30/19 13:05


Consult to Cardiology [CONS] Routine 


   Consulting Provider: HERMANN AREVALO


   Reason For Exam: TACHYCARDIA





05/30/19 13:20


Consult to Cardiology [CONS] Routine 


   Consulting Provider: HERMANN AREVALO


   Reason For Exam: TACHYCARDIA





05/31/19 12:36


Consult to Physician [CONS] Routine 


   Comment: 


   Consulting Provider: YVROSE SMILEY


   Physician Instructions: 


   Reason For Exam: sepsis secondary to acute cystitis











Primary care physician: 


KYLE RAMIREZ








Hospitalization


Reason for admission: sepsis


Condition: Stable


Hospital course: 


Patient is a 28-year-old female with history of cerebral palsy and diabetes who 

presented to the ED with complaint of fever.  Patient is unable to communicate 

appropriately, so history was obtained from the chart.  It was reported that she

 had a temperature of 100.2 with associated cough and congestion that began 

yesterday.  No reported history of vomiting or diarrhea.  In the ED, patient was

 found to be tachycardic in the 150s.  


Pateint was noted to have sepsis and started on emeperic antibiotics.  cultures 

were unrevealling. ID was consulted and recommended that though this may be 

possibly secondary to atypical pneumonia even possible viral and ok switiching 

from zosyn to ceftrxiaxone and azithromycin and to PO Ceftin 500 mg BID x 4 days

 and PO Azithromycin 500 mg daily x 2 more days


Patients family-grandmother was educated on megan persistent tachycardia and the 

possible tachycardia induced cardiomyopathy. she stated that she does not want 

any adjustment to her home medications.


Cardiology was also consulted to evaluate the patient with no further 

adjustments made








Discharge Diagnosis





Severe sepsis-POA





Acute Cystitis





Sinus tachycardia





DM with hyperglycemia





Asthma





Cerebral palsy with mental retardation





Seizure disorder





Chronic dysphagia





Disposition: DC-01 TO HOME OR SELFCARE


Time spent for discharge: 35 mins





Core Measure Documentation





- Palliative Care


Palliative Care/ Comfort Measures: Not Applicable





- Core Measures


Any of the following diagnoses?: none





Exam





- Physical Exam


Narrative exam: 


General appearance: Present: mild distress, not following commands, Cerebral 

deformity with noted proptosis





- EENT


Eyes: Present: PERRL, EOM intact


ENT: clear oral mucosa





- Neck


Neck: Present: supple





- Respiratory


Respiratory effort: labored


Respiratory: bilateral: CTA





- Cardiovascular


Rhythm: regular 


Heart Sounds: Present: S1 & S2





- Extremities


Extremity abnormal: edema (in feet)





- Abdominal


General gastrointestinal: Present: soft, non-tender, normal bowel sounds, other 

(peg tube noted)





- Integumentary


Integumentary: Present: warm, dry





- Musculoskeletal


Musculoskeletal: other (contracted upper and lower extremities)





- Psychiatric


Psychiatric: other (unable to assess due to baseline altered mental status)





- Neurologic


Neurologic: other (patient is awake but unable to follow commands)








- Constitutional


Vitals: 


                                        











Temp Pulse Resp BP Pulse Ox


 


 97.9 F   105 H  17   111/57   95 


 


 06/02/19 05:44  06/02/19 07:54  06/02/19 07:54  06/02/19 05:44  06/02/19 05:44














Plan


Activity: advance as tolerated, fall precautions


Diet: diabetic, per dietitian instruction


Special Instructions: record daily BP diary, record blood sugar diary


Follow up with: 


KYLE RAMIREZ MD [Primary Care Provider] - 3-5 Days


DEE DEE DALY MD [Staff Physician] - 7 Days


Prescriptions: 


cefUROXime [Ceftin] 250 mg PO Q12H 7 Days  tablet


Nystatin [Nystop Powder] 1 applicatio TP TID 5 Days  bottle


Azithromycin Oral Liqd [Zithromax 200 MG/5 ML ORAL LIQ] 250 mg PO QDAY 7 Days  

bottle

## 2019-10-04 ENCOUNTER — HOSPITAL ENCOUNTER (EMERGENCY)
Dept: HOSPITAL 5 - ED | Age: 29
Discharge: HOME | End: 2019-10-04
Payer: MEDICAID

## 2019-10-04 VITALS — DIASTOLIC BLOOD PRESSURE: 65 MMHG | SYSTOLIC BLOOD PRESSURE: 128 MMHG

## 2019-10-04 DIAGNOSIS — G80.9: ICD-10-CM

## 2019-10-04 DIAGNOSIS — J45.909: ICD-10-CM

## 2019-10-04 DIAGNOSIS — Z88.5: ICD-10-CM

## 2019-10-04 DIAGNOSIS — Z88.8: ICD-10-CM

## 2019-10-04 DIAGNOSIS — K94.29: Primary | ICD-10-CM

## 2019-10-04 DIAGNOSIS — Z91.040: ICD-10-CM

## 2019-10-04 PROCEDURE — 43762 RPLC GTUBE NO REVJ TRC: CPT

## 2019-10-04 PROCEDURE — 74018 RADEX ABDOMEN 1 VIEW: CPT

## 2019-10-04 PROCEDURE — 99283 EMERGENCY DEPT VISIT LOW MDM: CPT

## 2019-10-04 NOTE — XRAY REPORT
ABDOMEN, 2 VIEWS

10/4/2019



INDICATION / CLINICAL INFORMATION:

g-tube replacement.



COMPARISON:

None available.



FINDINGS:

The gastrostomy tube was injected with 50 cc of Omnipaque 300.

Contrast entered the stomach and duodenum.

No extravasation.



Signer Name: Juan Carney MD 

Signed: 10/4/2019 6:29 PM

 Workstation Name: [id number]

## 2019-10-04 NOTE — EMERGENCY DEPARTMENT REPORT
ED General Adult HPI





- General


Chief complaint: Tube Replacement


Stated complaint: G TUBE PULLED OUT


Time Seen by Provider: 10/04/19 17:53


Source: EMS, old records reviewed


Mode of arrival: Stretcher


Limitations: Other





- History of Present Illness


Initial comments: 





28-year-old female with history of cerebral palsy presents to ED for G-tube 

replacement.  G-tube has been out for approximately 1 hour now.  Caregiver at 

bedside.  Patient is nonverbal.


-: hour(s) (1)


Location: abdomen


Severity scale (0 -10): 0


Treatments Prior to Arrival: none





- Related Data


                                Home Medications











 Medication  Instructions  Recorded  Confirmed  Last Taken


 


Fluticasone [Flonase] 2 spray NS DAILY 01/26/16 05/30/19 09/15/17


 


Osmolite 1.5 Delon 1 can FEEDTUBE TID 01/26/16 05/30/19 09/15/17 10:00


 


VALPROIC ACID Liq [DepaKENE Liq] 10 ml PO Q12H 01/26/16 05/30/19 09/15/17 08:00


 


medroxyPROGESTERone ACETATE 150 mg IM [id number] 01/26/16 05/30/19 01/09/16





[Depo-Provera (Contraception)]    


 


AtorvaSTATin [Lipitor] 20 mg PO QHS 05/30/19 05/30/19 Unknown


 


Insulin Glargine,Hum.rec.anlog 10 unit SQ QAM 05/30/19 05/30/19 Unknown





[Lantus Solostar]    


 


Insulin Glargine,Hum.rec.anlog 50 unit SQ QPM 05/30/19 05/30/19 Unknown





[Lantus Solostar]    


 


Insulin Regular, Human [Novolin R] 10 unit SQ TID 05/30/19 05/30/19 Unknown








                                  Previous Rx's











 Medication  Instructions  Recorded  Last Taken  Type


 


Azithromycin Oral Liqd [Zithromax 250 mg PO QDAY 7 Days  bottle 06/02/19 Unknown

 Rx





200 MG/5 ML ORAL LIQ]    


 


Nystatin [Nystop Powder] 1 applicatio TP TID 5 Days  bottle 06/02/19 Unknown Rx


 


cefUROXime [Ceftin] 250 mg PO Q12H 7 Days  tablet 06/02/19 Unknown Rx











                                    Allergies











Allergy/AdvReac Type Severity Reaction Status Date / Time


 


Barbiturates Allergy  Unknown Verified 10/04/19 17:22


 


citric acid Allergy  Unknown Verified 01/26/16 14:20


 


codeine Allergy  Unknown Verified 01/26/16 14:20


 


Hydantoins Allergy  Unknown Verified 10/04/19 17:22


 


latex Allergy  Hives Verified 04/20/16 16:21


 


phenytoin sodium Allergy  Unknown Verified 01/26/16 14:20





[From Dilantin]     


 


phenytoin sodium extended Allergy  Unknown Verified 01/26/16 14:20





[From Dilantin]     


 


Succinimides Allergy  Unknown Verified 10/04/19 17:22














ED Review of Systems


ROS: 


Stated complaint: G TUBE PULLED OUT


Other details as noted in HPI





Comment: Unobtainable due to pts medical conditions





ED Past Medical Hx





- Past Medical History


Previous Medical History?: Yes


Hx Congestive Heart Failure: No


Hx Diabetes: No


Hx Seizures: Yes


Hx Asthma: Yes


Hx COPD: No


Hx HIV: No


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





- Surgical History


Additional Surgical History: G. tube





- Social History


Smoking Status: Unknown if ever smoked





- Medications


Home Medications: 


                                Home Medications











 Medication  Instructions  Recorded  Confirmed  Last Taken  Type


 


Fluticasone [Flonase] 2 spray NS DAILY 01/26/16 05/30/19 09/15/17 History


 


Osmolite 1.5 Delon 1 can FEEDTUBE TID 01/26/16 05/30/19 09/15/17 10:00 History


 


VALPROIC ACID Liq [DepaKENE Liq] 10 ml PO Q12H 01/26/16 05/30/19 09/15/17 08:00 

History


 


medroxyPROGESTERone ACETATE 150 mg IM [id number] 01/26/16 05/30/19 01/09/16 Histo

ry





[Depo-Provera (Contraception)]     


 


AtorvaSTATin [Lipitor] 20 mg PO QHS 05/30/19 05/30/19 Unknown History


 


Insulin Glargine,Hum.rec.anlog 10 unit SQ QAM 05/30/19 05/30/19 Unknown History





[Lantus Solostar]     


 


Insulin Glargine,Hum.rec.anlog 50 unit SQ QPM 05/30/19 05/30/19 Unknown History





[Lantus Solostar]     


 


Insulin Regular, Human [Novolin R] 10 unit SQ TID 05/30/19 05/30/19 Unknown 

History


 


Azithromycin Oral Liqd [Zithromax 250 mg PO QDAY 7 Days  bottle 06/02/19  

Unknown Rx





200 MG/5 ML ORAL LIQ]     


 


Nystatin [Nystop Powder] 1 applicatio TP TID 5 Days  bottle 06/02/19  Unknown Rx


 


cefUROXime [Ceftin] 250 mg PO Q12H 7 Days  tablet 06/02/19  Unknown Rx














ED Physical Exam





- General


Limitations: Other


General appearance: alert, in no apparent distress





- Head


Head exam: Present: atraumatic, normocephalic





- Eye


Eye exam: Present: normal appearance





- ENT


ENT exam: Present: mucous membranes moist





- Neck


Neck exam: Present: normal inspection





- Respiratory


Respiratory exam: Present: normal lung sounds bilaterally.  Absent: respiratory 

distress





- Cardiovascular


Cardiovascular Exam: Present: normal rhythm, tachycardia





- GI/Abdominal


GI/Abdominal exam: Present: soft, other (G tube missing from site; site is 

clean, nonerythematous, no purulent discharge).  Absent: tenderness





- Neurological Exam


Neurological exam: Present: other (at baseline per caregiver, pt is nonverbal)





- Psychiatric


Psychiatric exam: Present: normal affect, normal mood





- Skin


Skin exam: Present: warm, dry, intact, normal color





ED Course


                                   Vital Signs











  10/04/19





  17:19


 


Temperature 99.4 F


 


Pulse Rate 117 H


 


Respiratory 18





Rate 


 


Blood Pressure 149/77





[Right] 


 


O2 Sat by Pulse 96





Oximetry 














- Feeding Tube Replacement


Reason for Replacement: fell out


Initial Tube Inserted: greater than 4 weeks


Type of Tube: gastrostomy


Insertion Site Prior to Procedure: clean


Tube Used for Reinsertion: other


French Tube Size (F): 20


Balloon Size (mls): 10


Verification of Placement: gastrograffin injection


Tube Secured by: G-tube attachment device


Patient Tolerated Procedure: well


Critical care attestation.: 


If time is entered above; I have spent that time in minutes in the direct care 

of this critically ill patient, excluding procedure time.








ED Disposition


Clinical Impression: 


 Visit for feeding tube placement





Disposition: DC-01 TO HOME OR SELFCARE


Is pt being admited?: No


Condition: Stable


Instructions:  How to Use and Care for Your PEG Tube (ED)


Referrals: 


PRIMARY CARE,MD [Referring] - as needed


Time of Disposition: 18:44

## 2021-10-15 NOTE — CAT SCAN REPORT
PROCEDURE: CT ANGIO CHEST 

 

TECHNIQUE:  Computerized tomographic angiography of the chest was performed after the IV injection of
 iodinated nonionic contrast including image processing.  The image data was postprocessed using 2-di
mensional multiplanar reformatted (MPR) and 3-dimensional (MIP and/or volume rendered) techniques. Au
tomated exposure control, adjustment of mA and/or kV according to patient size, or iterative reconstr
uction dose optimization techniques were utilized.  

 

CT DOSE LENGTH PRODUCT: mGycm 

 

HISTORY: tachycardia 

 

COMPARISONS:  None . 

 

FINDINGS: 

 

Heart and pericardium: Normal. 

Thoracic aorta:  There is no thoracic aortic aneurysm or dissection.. 

Pulmonary vasculature: There is no pulmonary embolism.. 

Lymph nodes:  No enlarged thoracic lymph nodes. 

Lungs: There are infiltrates and atelectasis at the left lung base. 

Pleural space:  There is no pleural effusion or pneumothorax.. 

Musculoskeletal structures:  No significant abnormality. 

Upper abdominal structures:  No significant abnormality. 

 

IMPRESSION:   

 

There is no thoracic aortic aneurysm or dissection. 

 

There is no pulmonary embolism. 

 

There are infiltrates and atelectasis at the left lung base. 

 

There is no pleural effusion or pneumothorax. 

 

This document is electronically signed by Clive De Santiago MD., May 30 2019 06:06:42 AM ET
left LE/weight-bearing as tolerated

## 2022-01-03 ENCOUNTER — HOSPITAL ENCOUNTER (EMERGENCY)
Dept: HOSPITAL 5 - ED | Age: 32
LOS: 1 days | Discharge: HOME | End: 2022-01-04
Payer: MEDICAID

## 2022-01-03 DIAGNOSIS — Z79.899: ICD-10-CM

## 2022-01-03 DIAGNOSIS — Z79.4: ICD-10-CM

## 2022-01-03 DIAGNOSIS — Z91.040: ICD-10-CM

## 2022-01-03 DIAGNOSIS — Z88.5: ICD-10-CM

## 2022-01-03 DIAGNOSIS — Z88.8: ICD-10-CM

## 2022-01-03 DIAGNOSIS — E86.0: ICD-10-CM

## 2022-01-03 DIAGNOSIS — Z88.2: ICD-10-CM

## 2022-01-03 DIAGNOSIS — U07.1: Primary | ICD-10-CM

## 2022-01-03 DIAGNOSIS — E11.9: ICD-10-CM

## 2022-01-03 LAB
BAND NEUTROPHILS # (MANUAL): 0 K/MM3
BUN SERPL-MCNC: 6 MG/DL (ref 7–17)
BUN/CREAT SERPL: 30 %
CALCIUM SERPL-MCNC: 9.9 MG/DL (ref 8.4–10.2)
HCT VFR BLD CALC: 39.9 % (ref 30.3–42.9)
HEMOLYSIS INDEX: 22
HGB BLD-MCNC: 12.8 GM/DL (ref 10.1–14.3)
MCHC RBC AUTO-ENTMCNC: 32 % (ref 30–34)
MCV RBC AUTO: 83 FL (ref 79–97)
MYELOCYTES # (MANUAL): 0 K/MM3
PLATELET # BLD: 347 K/MM3 (ref 140–440)
PROMYELOCYTES # (MANUAL): 0 K/MM3
RBC # BLD AUTO: 4.81 M/MM3 (ref 3.65–5.03)
TOTAL CELLS COUNTED BLD: 100

## 2022-01-03 PROCEDURE — 80048 BASIC METABOLIC PNL TOTAL CA: CPT

## 2022-01-03 PROCEDURE — 96360 HYDRATION IV INFUSION INIT: CPT

## 2022-01-03 PROCEDURE — 82140 ASSAY OF AMMONIA: CPT

## 2022-01-03 PROCEDURE — 36415 COLL VENOUS BLD VENIPUNCTURE: CPT

## 2022-01-03 PROCEDURE — 85025 COMPLETE CBC W/AUTO DIFF WBC: CPT

## 2022-01-03 PROCEDURE — 85007 BL SMEAR W/DIFF WBC COUNT: CPT

## 2022-01-03 PROCEDURE — 71045 X-RAY EXAM CHEST 1 VIEW: CPT

## 2022-01-03 PROCEDURE — 99284 EMERGENCY DEPT VISIT MOD MDM: CPT

## 2022-01-03 PROCEDURE — 87040 BLOOD CULTURE FOR BACTERIA: CPT

## 2022-01-03 PROCEDURE — 96361 HYDRATE IV INFUSION ADD-ON: CPT

## 2022-01-03 NOTE — XRAY REPORT
CHEST 1 VIEW 1/3/2022 7:50 PM



INDICATION / CLINICAL INFORMATION:

Dyspnea.



COMPARISON: 

5/30/2019



FINDINGS: Limited as patient's hands overlie the lower chest bilaterally.



SUPPORT DEVICES: None.



HEART / MEDIASTINUM: No significant abnormality. 



LUNGS / PLEURA: No significant pulmonary or pleural abnormality. No pneumothorax. 



ADDITIONAL FINDINGS: No significant additional findings.



IMPRESSION:

1. No acute findings.



Signer Name: Presley Barnes MD 

Signed: 1/3/2022 9:02 PM

Workstation Name: [id number]

## 2022-01-03 NOTE — EMERGENCY DEPARTMENT REPORT
ED General Adult HPI





- General


Chief complaint: Medical Clearance


Stated complaint: COVID POS SYMPTOMS


Time Seen by Provider: 01/03/22 18:44


Source: EMS


Mode of arrival: Stretcher


Limitations: No Limitations





- History of Present Illness


Initial comments: 





Patient is 31 years old female with history of cerebral palsy and diabetes.  

Patient brought to the emergency room via EMS from a group home for evaluation 

of congestion that started this morning.  EMS stated that the group home tested 

her for COVID-19 and the test came back positive.  Patient is alert however she 

is not communicating.


Severity scale (0 -10): 0





- Related Data


                                Home Medications











 Medication  Instructions  Recorded  Confirmed  Last Taken


 


Fluticasone [Flonase] 2 spray NS DAILY 01/26/16 05/30/19 09/15/17


 


Osmolite 1.5 Delon 1 can FEEDTUBE TID 01/26/16 05/30/19 09/15/17 10:00


 


VALPROIC ACID Liq [DepaKENE Liq] 10 ml PO Q12H 01/26/16 05/30/19 09/15/17 08:00


 


medroxyPROGESTERone ACETATE 150 mg IM [id number] 01/26/16 05/30/19 01/09/16





[Depo-Provera (Contraception)]    


 


AtorvaSTATin [Lipitor] 20 mg PO QHS 05/30/19 05/30/19 Unknown


 


Insulin Glargine,Hum.rec.anlog 10 unit SQ QAM 05/30/19 05/30/19 Unknown





[Lantus Solostar]    


 


Insulin Glargine,Hum.rec.anlog 50 unit SQ QPM 05/30/19 05/30/19 Unknown





[Lantus Solostar]    


 


Insulin Regular, Human [Novolin R] 10 unit SQ TID 05/30/19 05/30/19 Unknown








                                  Previous Rx's











 Medication  Instructions  Recorded  Last Taken  Type


 


Azithromycin Oral Liqd [Zithromax 250 mg PO QDAY 7 Days  bottle 06/02/19 Unknown

 Rx





200 MG/5 ML ORAL LIQ]    


 


Nystatin [Nystop Powder] 1 applicatio TP TID 5 Days  bottle 06/02/19 Unknown Rx


 


cefUROXime [Ceftin] 250 mg PO Q12H 7 Days  tablet 06/02/19 Unknown Rx











                                    Allergies











Allergy/AdvReac Type Severity Reaction Status Date / Time


 


Barbiturates Allergy  Unknown Verified 10/04/19 17:22


 


citric acid Allergy  Unknown Verified 01/26/16 14:20


 


codeine Allergy  Unknown Verified 01/26/16 14:20


 


Hydantoins Allergy  Unknown Verified 10/04/19 17:22


 


latex Allergy  Hives Verified 04/20/16 16:21


 


phenytoin sodium Allergy  Unknown Verified 01/26/16 14:20





[From Dilantin]     


 


phenytoin sodium extended Allergy  Unknown Verified 01/26/16 14:20





[From Dilantin]     


 


Succinimides Allergy  Unknown Verified 10/04/19 17:22














ED Review of Systems


ROS: 


Stated complaint: COVID POS SYMPTOMS


Other details as noted in HPI





Comment: Unobtainable due to pts medical conditions





ED Past Medical Hx





- Past Medical History


Hx Congestive Heart Failure: No


Hx Diabetes: No


Hx Seizures: Yes


Hx Asthma: Yes


Hx COPD: No


Hx HIV: No


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





- Surgical History


Past Surgical History?: Yes


Additional Surgical History: G. tube





- Social History


Smoking Status: Unknown if ever smoked





- Medications


Home Medications: 


                                Home Medications











 Medication  Instructions  Recorded  Confirmed  Last Taken  Type


 


Fluticasone [Flonase] 2 spray NS DAILY 01/26/16 05/30/19 09/15/17 History


 


Osmolite 1.5 Delon 1 can FEEDTUBE TID 01/26/16 05/30/19 09/15/17 10:00 History


 


VALPROIC ACID Liq [DepaKENE Liq] 10 ml PO Q12H 01/26/16 05/30/19 09/15/17 08:00 

History


 


medroxyPROGESTERone ACETATE 150 mg IM [id number] 01/26/16 05/30/19 01/09/16 

History





[Depo-Provera (Contraception)]     


 


AtorvaSTATin [Lipitor] 20 mg PO QHS 05/30/19 05/30/19 Unknown History


 


Insulin Glargine,Hum.rec.anlog 10 unit SQ QAM 05/30/19 05/30/19 Unknown History





[Lantus Solostar]     


 


Insulin Glargine,Hum.rec.anlog 50 unit SQ QPM 05/30/19 05/30/19 Unknown History





[Lantus Solostar]     


 


Insulin Regular, Human [Novolin R] 10 unit SQ TID 05/30/19 05/30/19 Unknown H

istory


 


Azithromycin Oral Liqd [Zithromax 250 mg PO QDAY 7 Days  bottle 06/02/19  

Unknown Rx





200 MG/5 ML ORAL LIQ]     


 


Nystatin [Nystop Powder] 1 applicatio TP TID 5 Days  bottle 06/02/19  Unknown Rx


 


cefUROXime [Ceftin] 250 mg PO Q12H 7 Days  tablet 06/02/19  Unknown Rx














ED Physical Exam





- General


Limitations: No Limitations


General appearance: alert, in no apparent distress





- Head


Head exam: Present: atraumatic, normocephalic, normal inspection





- Eye


Eye exam: Present: normal appearance





- ENT


ENT exam: Present: mucous membranes moist





- Respiratory


Respiratory exam: Present: normal lung sounds bilaterally





- Cardiovascular


Cardiovascular Exam: Present: tachycardia





- GI/Abdominal


GI/Abdominal exam: Present: soft, normal bowel sounds.  Absent: distended, tend

erness





- Extremities Exam


Extremities exam: Present: other (contracted.)





- Neurological Exam


Neurological exam: Present: alert





- Skin


Skin exam: Present: warm





ED Course


                                   Vital Signs











  01/03/22 01/03/22 01/04/22





  17:02 21:01 01:55


 


Temperature 98.1 F  


 


Pulse Rate 125 H 110 H 100 H


 


Respiratory 17 18 18





Rate   


 


Blood Pressure 115/82 119/67 108/59





[Left]   


 


O2 Sat by Pulse 97 98 97





Oximetry   














ED Medical Decision Making





- Lab Data


Result diagrams: 


                                 01/03/22 19:40





                                 01/03/22 19:40





- Radiology Data


Radiology results: report reviewed





- Medical Decision Making





Patient is 31 years old female with history of cerebral palsy and diabetes.  

Patient brought to the emergency room via EMS from a group home for evaluation 

of congestion that started this morning.  EMS stated that the group home tested 

her for COVID-19 and the test came back positive.  Patient is alert however she 

is not communicating.





Patient remained stable in the ER with oxygen saturation of 97% on room air.  

Tachycardia improved with 1 L of normal saline.  I also reviewed patient record 

and patient has history of chronic tachycardia.  Chest x-ray showed no evidence 

of pneumonia.  Labs reviewed and is unremarkable except for slight elevated 

lactic acid and improved to less than 1:02 liter of fluids.  Patient will need 

to follow-up with her primary care physician in the next 2 to 3 days and to 

return to the ER if she develop any new symptoms.


Critical care attestation.: 


If time is entered above; I have spent that time in minutes in the direct care 

of this critically ill patient, excluding procedure time.








ED Disposition


Clinical Impression: 


 COVID-19, Acute dehydration





Disposition: 01 HOME / SELF CARE / HOMELESS


Is pt being admited?: No


Condition: Stable


Instructions:  COVID-19 Frequently Asked Questions, Dehydration, Adult, 

Easy-to-Read


Referrals: 


FLORIDA BERNSTEIN III, APRN-BC [Primary Care Provider] - 3-5 Days

## 2022-01-04 VITALS — SYSTOLIC BLOOD PRESSURE: 108 MMHG | DIASTOLIC BLOOD PRESSURE: 59 MMHG

## 2022-08-24 ENCOUNTER — HOSPITAL ENCOUNTER (EMERGENCY)
Dept: HOSPITAL 5 - ED | Age: 32
Discharge: HOME | End: 2022-08-24
Payer: MEDICAID

## 2022-08-24 VITALS — DIASTOLIC BLOOD PRESSURE: 65 MMHG | SYSTOLIC BLOOD PRESSURE: 115 MMHG

## 2022-08-24 DIAGNOSIS — K94.23: Primary | ICD-10-CM

## 2022-08-24 PROCEDURE — 99282 EMERGENCY DEPT VISIT SF MDM: CPT

## 2022-08-24 NOTE — EMERGENCY DEPARTMENT REPORT
ED General Adult HPI





- General


Chief complaint: Tube Replacement


Stated complaint: FEEDING TUBE FELL OUT


Time Seen by Provider: 08/24/22 12:21


Source: patient


Mode of arrival: Ambulatory


Limitations: No Limitations





- History of Present Illness


Initial comments: 





Patient is a 31-year-old female presenting for G-tube dislodgment.





- Related Data


                                Home Medications











 Medication  Instructions  Recorded  Confirmed  Last Taken


 


Fluticasone [Flonase] 2 spray NS DAILY 01/26/16 05/30/19 09/15/17


 


Osmolite 1.5 Delon 1 can FEEDTUBE TID 01/26/16 05/30/19 09/15/17 10:00


 


VALPROIC ACID Liq [DepaKENE Liq] 10 ml PO Q12H 01/26/16 05/30/19 09/15/17 08:00


 


medroxyPROGESTERone ACETATE 150 mg IM [id number] 01/26/16 05/30/19 01/09/16





[Depo-Provera (Contraception)]    


 


AtorvaSTATin [Lipitor] 20 mg PO QHS 05/30/19 05/30/19 Unknown


 


Insulin Glargine,Hum.rec.anlog 10 unit SQ QAM 05/30/19 05/30/19 Unknown





[Lantus Solostar]    


 


Insulin Glargine,Hum.rec.anlog 50 unit SQ QPM 05/30/19 05/30/19 Unknown





[Lantus Solostar]    


 


Insulin Regular, Human [Novolin R] 10 unit SQ TID 05/30/19 05/30/19 Unknown








                                  Previous Rx's











 Medication  Instructions  Recorded  Last Taken  Type


 


Azithromycin Oral Liqd [Zithromax 250 mg PO QDAY 7 Days  bottle 06/02/19 Unknown

 Rx





200 MG/5 ML ORAL LIQ]    


 


Nystatin [Nystop Powder] 1 applicatio TP TID 5 Days  bottle 06/02/19 Unknown Rx


 


cefUROXime [Ceftin] 250 mg PO Q12H 7 Days  tablet 06/02/19 Unknown Rx











                                    Allergies











Allergy/AdvReac Type Severity Reaction Status Date / Time


 


Barbiturates Allergy  Unknown Verified 08/24/22 09:53


 


citric acid Allergy  Unknown Verified 08/24/22 09:53


 


codeine Allergy  Unknown Verified 08/24/22 09:53


 


Hydantoins Allergy  Unknown Verified 08/24/22 09:53


 


latex Allergy  Hives Verified 08/24/22 09:53


 


phenytoin sodium Allergy  Unknown Verified 08/24/22 09:53





[From Dilantin]     


 


phenytoin sodium extended Allergy  Unknown Verified 08/24/22 09:53





[From Dilantin]     


 


Succinimides Allergy  Unknown Verified 08/24/22 09:53














ED Review of Systems


ROS: 


Stated complaint: FEEDING TUBE FELL OUT


Other details as noted in HPI





Comment: Unobtainable due to pts medical conditions





ED Past Medical Hx





- Past Medical History


Hx Congestive Heart Failure: No


Hx Diabetes: No


Hx Seizures: Yes


Hx Asthma: Yes


Hx COPD: No


Hx HIV: No


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





- Surgical History


Additional Surgical History: G. tube





- Social History


Smoking Status: Unknown if ever smoked





- Medications


Home Medications: 


                                Home Medications











 Medication  Instructions  Recorded  Confirmed  Last Taken  Type


 


Fluticasone [Flonase] 2 spray NS DAILY 01/26/16 05/30/19 09/15/17 History


 


Osmolite 1.5 Delon 1 can FEEDTUBE TID 01/26/16 05/30/19 09/15/17 10:00 History


 


VALPROIC ACID Liq [DepaKENE Liq] 10 ml PO Q12H 01/26/16 05/30/19 09/15/17 08:00 

History


 


medroxyPROGESTERone ACETATE 150 mg IM [id number] 01/26/16 05/30/19 01/09/16 

History





[Depo-Provera (Contraception)]     


 


AtorvaSTATin [Lipitor] 20 mg PO QHS 05/30/19 05/30/19 Unknown History


 


Insulin Glargine,Hum.rec.anlog 10 unit SQ QAM 05/30/19 05/30/19 Unknown History





[Lantus Solostar]     


 


Insulin Glargine,Hum.rec.anlog 50 unit SQ QPM 05/30/19 05/30/19 Unknown History





[Lantus Solostar]     


 


Insulin Regular, Human [Novolin R] 10 unit SQ TID 05/30/19 05/30/19 Unknown 

History


 


Azithromycin Oral Liqd [Zithromax 250 mg PO QDAY 7 Days  bottle 06/02/19  

Unknown Rx





200 MG/5 ML ORAL LIQ]     


 


Nystatin [Nystop Powder] 1 applicatio TP TID 5 Days  bottle 06/02/19  Unknown Rx


 


cefUROXime [Ceftin] 250 mg PO Q12H 7 Days  tablet 06/02/19  Unknown Rx














ED Physical Exam





- General


Limitations: No Limitations


General appearance: alert





- Head


Head exam: Present: atraumatic





- Respiratory


Respiratory exam: Present: normal lung sounds bilaterally.  Absent: respiratory 

distress





- Cardiovascular


Cardiovascular Exam: Present: normal rhythm, tachycardia, normal heart sounds





- GI/Abdominal


GI/Abdominal exam: Present: soft, other (Gastrostomy stoma present)





- Rectal


Rectal exam: Present: deferred





- Neurological Exam


Neurological exam: Present: alert





- Skin


Skin exam: Present: warm, dry, intact, normal color





ED Course





                                   Vital Signs











  08/24/22





  09:51


 


Temperature 98.8 F


 


Pulse Rate 125 H


 


Respiratory 18





Rate 


 


Blood Pressure 117/74





[Left] 


 


O2 Sat by Pulse 99





Oximetry 














ED Medical Decision Making





- Medical Decision Making





G-tube replaced.  Patient stable for discharge home.


Critical care attestation.: 


If time is entered above; I have spent that time in minutes in the direct care 

of this critically ill patient, excluding procedure time.








ED Disposition


Clinical Impression: 


 Dislodged gastrostomy tube





Disposition: 01 HOME / SELF CARE / HOMELESS


Is pt being admited?: No


Condition: Stable


Instructions:  Gastrostomy Tube Replacement, Care After


Time of Disposition: 12:44

## 2024-04-10 NOTE — CONSULTATION
- no current medications  - referral made to follow up with psychiatry    History of Present Illness


Consult date: 05/31/19


Consult reason: tachycardia


History of present illness: 





This is 28 year old woman with diabetes and cerebral palsy who was sent to this 

hospital with fever, shortness of breath and congestion. CTA of the chest was 

negative for pulmonary embolism but reports infiltrates and atelectasis of the 

left lung base. WBC is normal, however, urinalysis shows evidence of UTI. 12 

lead ECG is sinus tachycardia, rate 139, thus this cardiac consultation.





Past History


Past Surgical History: Other (PEG tube placement)





Medications and Allergies


                                    Allergies











Allergy/AdvReac Type Severity Reaction Status Date / Time


 


citric acid Allergy  Unknown Verified 01/26/16 14:20


 


codeine Allergy  Unknown Verified 01/26/16 14:20


 


latex Allergy  Hives Verified 04/20/16 16:21


 


phenytoin sodium Allergy  Unknown Verified 01/26/16 14:20





[From Dilantin]     


 


phenytoin sodium extended Allergy  Unknown Verified 01/26/16 14:20





[From Dilantin]     











                                Home Medications











 Medication  Instructions  Recorded  Confirmed  Last Taken  Type


 


Fluticasone [Flonase] 2 spray NS DAILY 01/26/16 05/30/19 09/15/17 History


 


Osmolite 1.5 Delon 1 can FEEDTUBE TID 01/26/16 05/30/19 09/15/17 10:00 History


 


VALPROIC ACID Liq [DepaKENE Liq] 10 ml PO Q12H 01/26/16 05/30/19 09/15/17 08:00 

History


 


medroxyPROGESTERone ACETATE 150 mg IM [id number] 01/26/16 05/30/19 01/09/16 

History





[Depo-Provera (Contraception)]     


 


AtorvaSTATin [Lipitor] 20 mg PO QHS 05/30/19 05/30/19 Unknown History


 


Insulin Glargine,Hum.rec.anlog 10 unit SQ QAM 05/30/19 05/30/19 Unknown History





[Lantus Solostar]     


 


Insulin Glargine,Hum.rec.anlog 50 unit SQ QPM 05/30/19 05/30/19 Unknown History





[Lantus Solostar]     


 


Insulin Regular, Human [Novolin R] 10 unit SQ TID 05/30/19 05/30/19 Unknown 

History











Active Meds: 


Active Medications





Acetaminophen (Tylenol)  650 mg PO Q4H PRN


   PRN Reason: Pain MILD(1-3)/Fever >100.5/HA


   Last Admin: 05/30/19 23:03 Dose:  650 mg


   Documented by: 


Lipase/Protease/Amylase (Pancreaze Dr 10,500 Unit)  1 each FEEDTUBE PRN PRN


   PRN Reason: For Clogged Feeding Tube


Dextrose (D50w (25gm) Syringe)  50 ml IV PRN PRN


   PRN Reason: Hypoglycemia


Enoxaparin Sodium (Lovenox)  40 mg SUB-Q QDAY [facility]


   Last Admin: 05/30/19 09:17 Dose:  40 mg


   Documented by: 


Sodium Chloride (Nacl 0.9% 1000 Ml)  1,000 mls @ 100 mls/hr IV AS DIRECT [facility]


   Last Admin: 05/30/19 22:43 Dose:  100 mls/hr


   Documented by: 


Piperacillin Sod/Tazobactam Sod (Zosyn/Ns 4.5gm/100ml)  4.5 gm in 100 mls @ 200 

mls/hr IV Q8H [facility]; Protocol


   Last Infusion: 05/31/19 04:33 Dose:  Infused


   Documented by: 


Insulin Glargine (Lantus)  15 units SUB-Q QHS [facility]


   Last Admin: 05/30/19 22:41 Dose:  15 units


   Documented by: 


Insulin Human Lispro (Humalog)  0 unit SUB-Q ACHS [facility]; Protocol


   Last Admin: 05/30/19 22:36 Dose:  Not Given


   Documented by: 


Ipratropium Bromide (Atrovent)  0.5 mg IH Q6HRT [facility]


   Last Admin: 05/31/19 08:24 Dose:  0.5 mg


   Documented by: 


Levalbuterol HCl (Xopenex)  0.63 mg IH Q6HRT [facility]


   Last Admin: 05/31/19 08:24 Dose:  0.63 mg


   Documented by: 


Ondansetron HCl (Zofran)  4 mg IV Q8H PRN


   PRN Reason: Nausea And Vomiting


Simple Syrup (Simple Syrup)  15 ml FEEDTUBE PRN PRN


   PRN Reason: Hypoglycemia


Simple Syrup (Simple Syrup)  30 ml FEEDTUBE PRN PRN


   PRN Reason: Hypoglycemia


Sodium Bicarbonate (Sodium Bicarbonate)  325 mg FEEDTUBE PRN PRN


   PRN Reason: For Clogged Feeding Tube


Sodium Chloride (Sodium Chloride Flush Syringe 10 Ml)  10 ml IV BID [facility]


   Last Admin: 05/30/19 23:03 Dose:  10 ml


   Documented by: 


Sodium Chloride (Sodium Chloride Flush Syringe 10 Ml)  10 ml IV PRN PRN


   PRN Reason: LINE FLUSH











Physical Examination


                                   Vital Signs











Temp Pulse Resp BP Pulse Ox


 


 100.0 F H  152 H  28 H  115/73   97 


 


 05/30/19 02:15  05/30/19 02:15  05/30/19 02:15  05/30/19 02:15  05/30/19 02:15











General appearance: no acute distress


HEENT: Positive: PERRL


Cardiac: Positive: Tachycardia


Lungs: Positive: Rhonchi





Results





                                 05/31/19 05:53





                                 05/31/19 05:53


                                       CBC











  05/31/19 Range/Units





  05:53 


 


WBC  6.2  (4.5-11.0)  K/mm3


 


RBC  4.03  (3.65-5.03)  M/mm3


 


Hgb  12.1  (10.1-14.3)  gm/dl


 


Hct  35.5  D  (30.3-42.9)  %


 


Plt Count  191  (140-440)  K/mm3








                          Comprehensive Metabolic Panel











  05/31/19 Range/Units





  05:53 


 


Sodium  140  (137-145)  mmol/L


 


Potassium  3.3 L  (3.6-5.0)  mmol/L


 


Chloride  106.4  ()  mmol/L


 


Carbon Dioxide  20 L  (22-30)  mmol/L


 


BUN  4 L  (7-17)  mg/dL


 


Creatinine  0.2 L  (0.7-1.2)  mg/dL


 


Glucose  181 H  ()  mg/dL


 


Calcium  8.7  (8.4-10.2)  mg/dL














Assessment and Plan





UTI


Pneumonia


Chronic sinus tachycardia


Cerebral palsy


Diabetes mellitus 99.9